# Patient Record
Sex: FEMALE | Race: BLACK OR AFRICAN AMERICAN | Employment: OTHER | URBAN - METROPOLITAN AREA
[De-identification: names, ages, dates, MRNs, and addresses within clinical notes are randomized per-mention and may not be internally consistent; named-entity substitution may affect disease eponyms.]

---

## 2019-12-17 ENCOUNTER — HOSPITAL ENCOUNTER (OUTPATIENT)
Dept: PHYSICAL THERAPY | Age: 79
Discharge: HOME | End: 2019-12-17
Payer: MEDICARE

## 2019-12-17 PROCEDURE — 97110 THERAPEUTIC EXERCISES: CPT

## 2019-12-17 PROCEDURE — 97162 PT EVAL MOD COMPLEX 30 MIN: CPT

## 2019-12-17 PROCEDURE — 97140 MANUAL THERAPY 1/> REGIONS: CPT

## 2019-12-17 NOTE — THERAPY EVALUATION
Allison Zendejas  : 1940  Payor: Emily Ortiz / Plan: [address] / Product Type: Managed Care Medicare /  [facility] [place]  at [facility]  [address]ie [address], [address], [place] [address] [address] [facility]  [phone]   [phone]        OUTPATIENT PHYSICAL THERAPY:Initial Assessment 2019    ICD-10: Treatment Diagnosis:   M54.5 Low Back Pain   M54.16 Lumbar Radiculopathy  R26.2 Difficulty in Walking, Not Elsewhere Classified  Precautions/Allergies:   Patient has no known allergies. Fall Risk Score: 3 (? 5 = High Risk)  MD Orders: Eval and Treat MEDICAL/REFERRING DIAGNOSIS:  Low back pain [M54.5]   DATE OF ONSET:   REFERRING PHYSICIAN: Fritz Bergeron MD  RETURN PHYSICIAN APPOINTMENT: TBD by patient     ASSESSMENT:  Ms. Andre Figueroa has attended 1 physical therapy session including the initial evaluation. Pt presents with s/s consistent with referring diagnosis as well as radicular symptoms in BLE. She has impaired mobility and uses cane at baseline for ambulation. Gross bilat hip strength noted, putting her at risk for falls and decreased ambulation ability due to pain and weakness. Her pain is decreasing her homemaking ability and social life as well. Pt requires skilled PT to address above impairments in order to increased strength, decreased pain, increased ROM and decreased fall risk. Recommending skilled PT: manual therapeutic techniques (as appropriate), therapeutic exercises and activities, balance interventions, and a comprehensive home exercise program to address the current impairments, as listed above. Allison Zendejas will benefit from skilled PT (medically necessary) to address above deficits affecting participation in basic ADLs and overall functional tolerance. PROBLEM LIST (Impacting functional limitations):  1. Decreased Strength  2. Decreased ADL/Functional Activities  3. Decreased Transfer Abilities  4.  Decreased Ambulation Ability/Technique  5. Decreased Balance  6. Increased Pain  7. Decreased Activity Tolerance  8. Increased Fatigue  9. Decreased Flexibility/Joint Mobility  10. Decreased [place] with Home Exercise Program INTERVENTIONS PLANNED:  1. Balance Exercise  2. Bed Mobility  3. Cold  4. Cryotherapy  5. Electrical Stimulation  6. Gait Training  7. Heat  8. Home Exercise Program (HEP)  9. Manual Therapy  10. Neuromuscular Re-education/Strengthening  11. Range of Motion (ROM)  12. Therapeutic Activites  13. Therapeutic Exercise/Strengthening  14. Transcutaneous Electrical Nerve Stimulation (TENS)  15. Transfer Training  16. Aquatic Therapy   TREATMENT PLAN:  TREATMENT PLAN:  Effective Dates: 12/17/2019 TO 3/16/2020 (90 days). Frequency/Duration: 3 times a week for 90 Days (2x/week 12/17/2019-12/27/2019 due to scheduling conflicts)  Short-Term Functional Goals: Time Frame: 4 weeks  1. Pt will be compliant with HEP. 2. Pt will decreased worst pain to 4/10 or less in order to decreased antalgia while walking  3. Pt will decreased BARBY to 20 or less in order to resume normal social activities  4. Pt will improve BLE strength to 4/5 in order to improve transfer ability  5. Pt will improve 5x sit to stand to 40 sec or less (with UE)  Discharge Goals: Time Frame: 8 weeks  1. Pt will be independent with HEP. 2. Pt will decreased worst pain to 2/10 or less in order to decrease antalgia while walking  3. Pt will decreased BARBY to 12 or less in order to resume normal social activities  4. Pt will improve BLE strength to 4+/5 or greater in order to improve transfer abiltiy  5. Pt will improve 5x sit to stand to 20 sec or less to decrease fall risk (with UE)    Rehabilitation Potential For Stated Goals: Fair  Regarding Joleen Ward's therapy, I certify that the treatment plan above will be carried out by a therapist or under their direction.   Thank you for this referral,  Shannon Anaya, PT, DPT     Referring Physician Signature: Allison Jorgensen MD              Date                    The information in this section was collected on 12/17/2019 (except where otherwise noted). HISTORY:   History of Present Injury/Illness (Reason for Referral):  Back surgery in May (Pt reports that M.D. \"scraped the arthritis out\"). When sitting it hurts then eases, laying hurts then eases, standing causes continuous pain. Feet having numbness (gabapentin is helping a little bit). All reports states foot numbness and leg pain were present prior to surgery, but back pain has gotten worse. Jp and screws present in back. Second back surgery in 2006, pain stimulator put in ~2017 (states it has not helped). \"I have a knee problem, my knee is killing me. \" No falls in the last 3 month. Pain pills are not helping. CC/Primary Concern:        Pain with walking, sitting and standing. Treatment Side: Bilateral    Hospitalization and time spent in care: Outpatient procedure in May (Wednesday-Friday)    Past Medical History/Comorbidities:   Ms. Sierra Verdin  has a past medical history of Arthritis, Burning feet syndrome (9/23/2016), Diverticulitis, DJD (degenerative joint disease) (9/6/2013), Gastrointestinal disorder, GERD (gastroesophageal reflux disease), Hypertension, Psychiatric disorder, and Thyroid disease. She also has no past medical history of Dementia, Endocrine disease, Hypercholesterolemia, Infectious disease, or Sleep disorder. Ms. Sierra Verdin  has a past surgical history that includes hx cholecystectomy; hx gi; pr neurological procedure unlisted; hx orthopaedic; hx gyn; hx heent; hx heent; and hx lithotripsy. Social History/Living Environment:     Pt lives alone, daughters are able to help with grandkids. Cane to ambulate, ramp on back door. Pain/Symptom Location: LT low back and leg.      Worst Pain: 10/10  Current Pain: 8/10    Aggravating Factors: Sitting, Standing, Walking, Stairs Up, Stairs Down, Bending, Twisting, Bed Mobility, Sit to stand and Stand to sit    Alleviating Factors/Positions/Motions: After laying or sitting for a while    General Health: Fair    Diagnostic Imaging: X-Rays     Occupation: unemployed, disabled    Unexplained Weight Loss: No      Prior Level of Function/Work/Activity:  Cane since 2018 has help from children and grandchildren to help with housework. Patient Goals: To get better walking, sitting and standing    Current Medications:       Current Outpatient Medications:     famotidine (PEPCID) 20 mg tablet, Take 1 Tab by mouth two (2) times a day., Disp: 90 Tab, Rfl: 2    gabapentin (NEURONTIN) 100 mg capsule, Take 1 Cap by mouth two (2) times a day., Disp: 60 Cap, Rfl: 02    mirtazapine (REMERON) 30 mg tablet, Take 1 Tab by mouth nightly., Disp: 30 Tab, Rfl: 3    ondansetron hcl (ZOFRAN) 8 mg tablet, Take 1 Tab by mouth every eight (8) hours as needed for Nausea., Disp: 30 Tab, Rfl: 1    amLODIPine (NORVASC) 2.5 mg tablet, Take 1 Tab by mouth daily. , Disp: 90 Tab, Rfl: 4    aspirin delayed-release 81 mg tablet, Take 81 mg by mouth daily. , Disp: , Rfl:    Date Last Reviewed:  12/17/2019       Ambulatory/Rehab Services H2 Model Falls Risk Assessment    Risk Factors:       (2)  Symptomatic Depression Ability to Rise from Chair:       (1)  Pushes up, successful in one attempt    Falls Prevention Plan:       No modifications necessary   Total: (5 or greater = High Risk): 3    ©2010 [facility] [address] States Patent #[id number].  Federal Law prohibits the replication, distribution or use without written permission from [facility]        Number of Personal Factors/Comorbidities that affect the Plan of Care: 3+: HIGH COMPLEXITY   EXAMINATION:   Inspection        Patient Consent: Yes        Iliac Crest: Left Elevated      PSIS: Level      ASIS: Level        Additional Comments:   ________________________________________________________________________________________________  Observation Transfers: Sit to stand: increased UE use and increased time  and Supine to sit: Increased time and pain (Taught log roll)        Posture: Rolled shoulder, increased thoracic kyphosis, increased lumbar lordosis        Gait: Antalgic, Lacks Proper Foot Strike/Toe Off, Decreased Step Length and decreased hip ext        Assistive Device:        Type: straight cane        Hand Use: Left         Stairs: Not tested        Edema: No Edema, Erythema or Ecchymosis noted          Pitting: No        Movement Apprehension: Trunk flexion, hip flexion, Knee ext (RT>LT)    ________________________________________________________________________________________________  Range of Motion        Lumbar  Joint:      Right (Degrees/Percent) Left (Degrees/Percent)   Flexion Hands to reach thighs    Extension 20%    Side Bending Not tested Not tested   Rotation 40% 40%               Additional Comments: Pain with flexion and extension in low back. No evidence of centralization   ________________________________________________________________________________________________  Strength          Lower Extremity  Joint:      RIGHT LEFT   Hip Flexion 3-/5 3/5   Hip Extension 2+/5 2+/5   Hip Internal Rotation 4-/5 4-/5   Hip External Rotation 5/5 5/5     ________________________________________________________________________________________________  [facility]        C/O Radicular Symptoms: Yes: Pain from low back down LLE to foot.  Bilat foot numbness, decreased sensation on RT side           LE Neurologic Screen  LE NEUROLOGICAL SCREEN:     -MYOTOMES  Date:     Right Left   L1 & L2  (Hip Flexion) WNL WNL   L3   (Knee Ext) WNL WNL   L4  (Dorsiflexion) WNL WNL   L5  (Great Toe Ext) WNL WNL   S1  (Plantarflexion) WNL WNL   S2  (Knee Flex) WNL WNL       -Dermatomoes  Date:     Right Left   L1/2 WNL WNL   L3 WNL WNL   L4 Diminished to light touch Diminished to light touch   L5 Diminished to light touch WNL   S1 Diminished to light touch WNL S2 Diminished to light touch WNL     ________________________________________________________________________________________________  Special Tests        Lumbar:        Lucita's SignPositive on LT, SLR: Positive for low back pain on RT, Passive Vertebral Mobility: hypomobile grossly (Jp in back), Josefa: Positive bilat and SI Compression: positive            ________________________________________________________________________________________________  Palpation: Increased tenderness in RT greater trochanter, increased tenderness in lumbar paraspinals, increased tone in thoracic paraspinals. Body Structures Involved:  11. Bones  12. Joints  13. Muscles  14. Ligaments Body Functions Affected:  17. Sensory/Pain  18. Neuromusculoskeletal  19. Movement Related Activities and Participation Affected:  6. General Tasks and Demands  7. Mobility  8. Self Care  9. Domestic Life  10. Interpersonal Interactions and Relationships  11. Community, Social and [place]   Number of elements (examined above) that affect the Plan of Care: 4+: HIGH COMPLEXITY   CLINICAL PRESENTATION:   Presentation: Evolving clinical presentation with changing clinical characteristics: MODERATE COMPLEXITY   CLINICAL DECISION MAKING:   Outcome Measure: Tool Used: Modified Oswestry Low Back Pain Questionnaire  Score:  Initial: 32/50  Most Recent: X/50 (Date: -- )   Interpretation of Score: Each section is scored on a 0-5 scale, 5 representing the greatest disability. The scores of each section are added together for a total score of 50. Tool Used: Five Times Sit to Stand (FTSST or 5xSST)  Score:  Initial: 55 seconds (with UE) Most Recent:  seconds (Date: -- )   Interpretation of Score: This is a measure of functional lower limb muscle strength and quantifying functional change of transitional movements.   A score of 12 seconds or less indicates a normal level of function for community dwelling older individuals, a score of 15 seconds or more is at risk for fall. Medical Necessity:   · Patient is expected to demonstrate progress in strength, range of motion and balance to improve safety during ambulation and decrease pain with household and community ADLs. Reason for Services/Other Comments:  · Patient continues to require skilled intervention due to increased pain, weakness and decreased ROM putting pt at risk for falls .    Use of outcome tool(s) and clinical judgement create a POC that gives a: Questionable prediction of patient's progress: MODERATE COMPLEXITY

## 2019-12-17 NOTE — PROGRESS NOTES
Yasemin Matos  : 1940  Payor: Mckay Obrien / Plan: [address] / Product Type: Managed Care Medicare /  [facility] [place]  at [facility]  [facility], [address], [place], [address] [facility]  [phone]   [phone]      OUTPATIENT PHYSICAL THERAPY: Daily Treatment Note 2019  Visit Count:  1    ICD-10: Treatment Diagnosis:   M54.5 Low Back Pain   M54.16 Lumbar Radiculopathy  R26.2 Difficulty in Walking, Not Elsewhere Classified  Precautions/Allergies:   Patient has no known allergies. Fall Risk Score: 3 (? 5 = High Risk)  TREATMENT PLAN:    Effective Dates: 2019 TO 3/16/2020 (90 days). Frequency/Duration: 3 times a week for 90 Days (2x/week for 2019-2019)        PRE-TREATMENT SYMPTOMS/COMPLAINTS:  See Eval    MEDICATIONS REVIEWED:  2019   TREATMENT:   In addition to Assessment/Re-Assessment sessions the following treatments were rendered)    THERAPEUTIC EXERCISE: (15 minutes):  Exercises per grid below to improve mobility, strength and balance. Required minimal visual and verbal cues to promote proper body alignment and promote proper body posture. Progressed resistance, range and complexity of movement as indicated. Date:  2019 Date:   Date:     Activity/Exercise Parameters Parameters Parameters   Lower Trunk Rotations Hooklying x 15 with 5sec hold     Knee to chest X 20 sec RT leg with strap     SciFit BUE and BLE with  [facility] x 10 min level 2                               MANUAL THERAPY: (25 minutes): Joint mobilization, Soft tissue mobilization and Manipulation was utilized and necessary because of the patient's restricted joint motion, painful spasm, loss of articular motion and restricted motion of soft tissue.    +STM to lumbar paraspinals  +STM to bilat piriformis  +Lumbar UPA (RT side L2-5) and CPA grade II/III  +Manual stretch of bilat hamstrings and glutes    Neuromuscular Re-education (0 min):  Exercise/activities per grid below to improve posture and and facilitate muscle activation. Required moderate visual, verbal and tactile cues     Date:     Date:   Date:     Activity/Exercise Parameters Parameters Parameters   Hooklying TA      Sciatic Nerve Glide                                        (Used abbreviations: MET - muscle energy technique; PNF - proprioceptive neuromuscular facilitation; NMR - neuromuscular re-education; AP - anterior to posterior; PA - posterior to anterior)    MODALITIES: (10 minutes): *  Hot Pack Therapy in order to provide analgesia. In conjunction with sci fit      TREATMENT/SESSION ASSESSMENT:  Georgia Duarte verbalized understanding of role of PT and POC. Pt with good overall response to manual therapy and enjoyed using sci fit. Subjective pain response decreased. Per eval, pt has increased weakness, pain and decreased balance contributing to gait deviations and fall risk. Required moderate verbal and visual cue to exercise from and techniques with education to perform as HEP. Education: HEP for LTR and SKTC    RECOMMENDATIONS/INTENT FOR NEXT TREATMENT SESSION: \"Next visit will focus on advancements to more challenging activities\".     PAIN: Initial: 8/10 Post Session:  6/10     [employer] Portal    Total Treatment Billable Duration: 40  PT Patient Time In/Time Out  Time In: 1300  Time Out: [address], PT, DPT    Future Appointments   Date Time Provider Demetria George   12/20/2019 10:15 AM Iqra Moss [facility]   12/24/2019 11:00 AM [facility]   12/27/2019  2:30 PM [facility]   12/30/2019  2:30 PM [facility]   1/3/2020  2:30 PM [facility] S[facility]   1/6/2020  2:30 PM [facility] S[facility]   1/8/2020  2:30 PM [facility] S[facility]   1/10/2020  1:00 PM [facility]   1/10/2020  2:45 PM Violeta Viramontes MD [facility]

## 2019-12-20 ENCOUNTER — HOSPITAL ENCOUNTER (OUTPATIENT)
Dept: PHYSICAL THERAPY | Age: 79
Discharge: HOME | End: 2019-12-20
Payer: MEDICARE

## 2019-12-27 ENCOUNTER — HOSPITAL ENCOUNTER (OUTPATIENT)
Dept: PHYSICAL THERAPY | Age: 79
Discharge: HOME | End: 2019-12-27
Payer: MEDICARE

## 2019-12-27 PROCEDURE — 97140 MANUAL THERAPY 1/> REGIONS: CPT

## 2019-12-27 PROCEDURE — 97110 THERAPEUTIC EXERCISES: CPT

## 2019-12-27 NOTE — PROGRESS NOTES
Allison Zendejas  : 1940  Payor: Emily Ortiz / Plan: [address] / Product Type: Managed Care Medicare /  [facility] [place]  at [facility]  [address]ie [address], [address], [place], [facility]  [phone]   [phone]      OUTPATIENT PHYSICAL THERAPY: Daily Treatment Note 2019  Visit Count:  2    ICD-10: Treatment Diagnosis:   M54.5 Low Back Pain   M54.16 Lumbar Radiculopathy  R26.2 Difficulty in Walking, Not Elsewhere Classified  Precautions/Allergies:   Patient has no known allergies. Fall Risk Score: 3 (? 5 = High Risk)  TREATMENT PLAN:    Effective Dates: 2019 TO 3/16/2020 (90 days). Frequency/Duration: 3 times a week for 90 Days (2x/week for 2019-2019)        PRE-TREATMENT SYMPTOMS/COMPLAINTS: \"I ain't worth a carrie today. \" Pt reports she has been trying exercises, but getting out of bed is still difficult. MEDICATIONS REVIEWED:  2019   TREATMENT:       THERAPEUTIC EXERCISE: (10 minutes):  Exercises per grid below to improve mobility, strength and balance. Required minimal visual and verbal cues to promote proper body alignment and promote proper body posture. Progressed resistance, range and complexity of movement as indicated. Date:  2019 Date:   Date:     Activity/Exercise Parameters Parameters Parameters   Lower Trunk Rotations Hooklying x 15 with 5sec hold     Knee to chest X 20 sec RT leg with strap     SciFit BUE and BLE with  [facility] x 10 min level 2 BUE and BLE x10 min lvl 2 with  [facility]                              MANUAL THERAPY: (25 minutes): Joint mobilization, Soft tissue mobilization and Manipulation was utilized and necessary because of the patient's restricted joint motion, painful spasm, loss of articular motion and restricted motion of soft tissue.    +STM to lumbar paraspinals (Using roller)  +STM to bilat piriformis (Using Roller Bar)  +Lumbar UPA (RT side L2-5) and CPA grade II/III  +Manual stretch of bilat hamstrings and glutes  +Sidelying lumbar roll mobilization bilat Grade II/III      Neuromuscular Re-education (0 min):  Exercise/activities per grid below to improve posture and and facilitate muscle activation. Required moderate visual, verbal and tactile cues     Date:     Date:   Date:     Activity/Exercise Parameters Parameters Parameters   Hooklying TA      Sciatic Nerve Glide                                        (Used abbreviations: MET - muscle energy technique; PNF - proprioceptive neuromuscular facilitation; NMR - neuromuscular re-education; AP - anterior to posterior; PA - posterior to anterior)    MODALITIES: (10 minutes): *  Hot Pack Therapy in order to provide analgesia. In conjunction with sci fit      TREATMENT/SESSION ASSESSMENT:  Pt with good response to stretching in clinic today. She missed her first two appointment following evaluation and had noted stiffness that has persisted even with the use of current HEP. She had decreased pain following manual therapy above. Treatment with heavy manual focus due to increased over stiffness and poor response to active motions. Pt advised to continue HEP and was explained the importance of regularly attending sessions. Education: HEP for LTR and SKTC    RECOMMENDATIONS/INTENT FOR NEXT TREATMENT SESSION: \"Next visit will focus on advancements to more challenging activities\".     PAIN: Initial: 8/10 Post Session: 5/10     [facility] Portal    Total Treatment Billable Duration: 40  PT Patient Time In/Time Out  Time In: 1430  Time Out: Leia [address], PT, DPT    Future Appointments   Date Time Provider Demetria George   12/30/2019  2:30 PM [facility]   1/3/2020  2:30 PM [facility]   1/6/2020  2:30 PM [facility]   1/8/2020  2:30 PM [facility]   1/10/2020  1:00 PM [facility]   1/10/2020  2:45 PM Laura Steele MD [facility]

## 2019-12-30 ENCOUNTER — HOSPITAL ENCOUNTER (OUTPATIENT)
Dept: PHYSICAL THERAPY | Age: 79
Discharge: HOME | End: 2019-12-30
Payer: MEDICARE

## 2019-12-30 PROCEDURE — 97140 MANUAL THERAPY 1/> REGIONS: CPT

## 2019-12-30 PROCEDURE — 97110 THERAPEUTIC EXERCISES: CPT

## 2019-12-30 NOTE — PROGRESS NOTES
Sulma Jeff  : 1940  Payor: Haroon Nery / Plan: [address] / Product Type: Managed Care Medicare /  [facility] [place]  at [facility]  Natalie 66, [address], [place], [facility]  [phone]   [phone]      OUTPATIENT PHYSICAL THERAPY: Daily Treatment Note 2019  Visit Count:  3    ICD-10: Treatment Diagnosis:   M54.5 Low Back Pain   M54.16 Lumbar Radiculopathy  R26.2 Difficulty in Walking, Not Elsewhere Classified  Precautions/Allergies:   Patient has no known allergies. Fall Risk Score: 3 (? 5 = High Risk)  TREATMENT PLAN:    Effective Dates: 2019 TO 3/16/2020 (90 days). Frequency/Duration: 3 times a week for 90 Days (2x/week for 2019-2019)        PRE-TREATMENT SYMPTOMS/COMPLAINTS: Pt report she felt better after last session, but then started hurting again the next day. Reports she did not exercise as instructed. MEDICATIONS REVIEWED:  2019   TREATMENT:       THERAPEUTIC EXERCISE: (10 minutes):  Exercises per grid below to improve mobility, strength and balance. Required minimal visual and verbal cues to promote proper body alignment and promote proper body posture. Progressed resistance, range and complexity of movement as indicated. Date:  2019 Date:   Date:     Activity/Exercise Parameters Parameters Parameters   Lower Trunk Rotations Hooklying x 15 with 5sec hold     Knee to chest X 20 sec RT leg with strap     SciFit BUE and BLE with  [facility] x 10 min level 2 BUE and BLE x10 min lvl 2 with  [facility]                              MANUAL THERAPY: (30 minutes): Joint mobilization, Soft tissue mobilization and Manipulation was utilized and necessary because of the patient's restricted joint motion, painful spasm, loss of articular motion and restricted motion of soft tissue.    +STM to lumbar paraspinals (Using roller)  +STM to bilat piriformis (Using [employer])  +Lumbar UPA (RT side L2-5) and CPA grade II/III  +Manual stretch of bilat hamstrings and glutes  +Sidelying lumbar roll mobilization bilat Grade II/III      Neuromuscular Re-education (0 min):  Exercise/activities per grid below to improve posture and and facilitate muscle activation. Required moderate visual, verbal and tactile cues     Date:     Date:   Date:     Activity/Exercise Parameters Parameters Parameters   Hooklying TA      Sciatic Nerve Glide                                        (Used abbreviations: MET - muscle energy technique; PNF - proprioceptive neuromuscular facilitation; NMR - neuromuscular re-education; AP - anterior to posterior; PA - posterior to anterior)    MODALITIES: (10 minutes): *  Hot Pack Therapy in order to provide analgesia. In conjunction with sci fit      TREATMENT/SESSION ASSESSMENT: Pt was reminded of importance in regards to stretching with HEP. Still responds very well to manual stretching and soft tissue mobilizations. She was able to move on and off of table easier following mobilization. Pt may need continued convincing that exercises are beneficial as she tends to ask more about medications. Pt has gross weakness (RT>LT) and is still a fall risk. Education: Cont HEP    RECOMMENDATIONS/INTENT FOR NEXT TREATMENT SESSION: \"Next visit will focus on advancements to more challenging activities\".     PAIN: Initial: 8/10 Post Session: 4/10     [facility] Portal    Total Treatment Billable Duration: 40  PT Patient Time In/Time Out  Time In: 1430  Time Out: Leia 67, PT, DPT    Future Appointments   Date Time Provider Demetria George   1/3/2020  2:30 PM [facility]   1/6/2020  2:30 PM [facility]   1/8/2020  2:30 PM [facility]   1/10/2020  1:00 PM [facility]   1/10/2020  2:45 PM Yolis Alexis MD [facility]

## 2020-01-03 ENCOUNTER — HOSPITAL ENCOUNTER (OUTPATIENT)
Dept: PHYSICAL THERAPY | Age: 80
Discharge: HOME | End: 2020-01-03

## 2020-01-10 ENCOUNTER — APPOINTMENT (OUTPATIENT)
Dept: PHYSICAL THERAPY | Age: 80
End: 2020-01-10

## 2020-08-04 ENCOUNTER — HOSPITAL ENCOUNTER (OUTPATIENT)
Dept: PHYSICAL THERAPY | Age: 80
Discharge: HOME | End: 2020-08-04
Payer: COMMERCIAL

## 2020-08-04 PROCEDURE — 97161 PT EVAL LOW COMPLEX 20 MIN: CPT

## 2021-09-09 ENCOUNTER — HOSPITAL ENCOUNTER (OUTPATIENT)
Dept: LAB | Age: 81
Discharge: HOME | End: 2021-09-09

## 2021-09-09 PROCEDURE — 88312 SPECIAL STAINS GROUP 1: CPT

## 2021-09-09 PROCEDURE — 88305 TISSUE EXAM BY PATHOLOGIST: CPT

## 2022-05-15 ENCOUNTER — HOSPITAL ENCOUNTER (EMERGENCY)
Age: 82
Discharge: HOME | End: 2022-05-15
Attending: EMERGENCY MEDICINE
Payer: MEDICARE

## 2022-05-15 ENCOUNTER — APPOINTMENT (OUTPATIENT)
Dept: CT IMAGING | Age: 82
End: 2022-05-15
Attending: EMERGENCY MEDICINE
Payer: MEDICARE

## 2022-05-15 VITALS
DIASTOLIC BLOOD PRESSURE: 89 MMHG | RESPIRATION RATE: 14 BRPM | HEIGHT: 69 IN | OXYGEN SATURATION: 99 % | BODY MASS INDEX: 22.36 KG/M2 | WEIGHT: 151 LBS | HEART RATE: 74 BPM | TEMPERATURE: 98.9 F | SYSTOLIC BLOOD PRESSURE: 176 MMHG
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
## 2022-05-15 LAB
ANION GAP SERPL CALC-SCNC: 4 MMOL/L (ref 7–16)
BASOPHILS # BLD: 0 K/UL (ref 0–0.2)
BASOPHILS NFR BLD: 0 % (ref 0–2)
BUN SERPL-MCNC: 25 MG/DL (ref 8–23)
CALCIUM SERPL-MCNC: 9.7 MG/DL (ref 8.3–10.4)
CHLORIDE SERPL-SCNC: 112 MMOL/L (ref 98–107)
CO2 SERPL-SCNC: 27 MMOL/L (ref 21–32)
CREAT SERPL-MCNC: 1.1 MG/DL (ref 0.6–1)
DIFFERENTIAL METHOD BLD: NORMAL
EOSINOPHIL # BLD: 0.1 K/UL (ref 0–0.8)
EOSINOPHIL NFR BLD: 1 % (ref 0.5–7.8)
ERYTHROCYTE [DISTWIDTH] IN BLOOD BY AUTOMATED COUNT: 12.7 % (ref 11.9–14.6)
GLUCOSE SERPL-MCNC: 92 MG/DL (ref 65–100)
HCT VFR BLD AUTO: 39 % (ref 35.8–46.3)
HGB BLD-MCNC: 13 G/DL (ref 11.7–15.4)
IMM GRANULOCYTES # BLD AUTO: 0 K/UL (ref 0–0.5)
IMM GRANULOCYTES NFR BLD AUTO: 0 % (ref 0–5)
LYMPHOCYTES # BLD: 3.1 K/UL (ref 0.5–4.6)
LYMPHOCYTES NFR BLD: 41 % (ref 13–44)
MCH RBC QN AUTO: 31.2 PG (ref 26.1–32.9)
MCHC RBC AUTO-ENTMCNC: 33.3 G/DL (ref 31.4–35)
MCV RBC AUTO: 93.5 FL (ref 79.6–97.8)
MONOCYTES # BLD: 0.7 K/UL (ref 0.1–1.3)
MONOCYTES NFR BLD: 10 % (ref 4–12)
NEUTS SEG # BLD: 3.6 K/UL (ref 1.7–8.2)
NEUTS SEG NFR BLD: 47 % (ref 43–78)
NRBC # BLD: 0 K/UL (ref 0–0.2)
PLATELET # BLD AUTO: 216 K/UL (ref 150–450)
PMV BLD AUTO: 9.9 FL (ref 9.4–12.3)
POTASSIUM SERPL-SCNC: 4.1 MMOL/L (ref 3.5–5.1)
RBC # BLD AUTO: 4.17 M/UL (ref 4.05–5.2)
SODIUM SERPL-SCNC: 143 MMOL/L (ref 136–145)
WBC # BLD AUTO: 7.5 K/UL (ref 4.3–11.1)

## 2022-05-15 PROCEDURE — 72125 CT NECK SPINE W/O DYE: CPT

## 2022-05-15 PROCEDURE — 74011000636 HC RX REV CODE- 636: Performed by: EMERGENCY MEDICINE

## 2022-05-15 PROCEDURE — 99285 EMERGENCY DEPT VISIT HI MDM: CPT

## 2022-05-15 PROCEDURE — 74011000250 HC RX REV CODE- 250: Performed by: EMERGENCY MEDICINE

## 2022-05-15 PROCEDURE — 80048 BASIC METABOLIC PNL TOTAL CA: CPT

## 2022-05-15 PROCEDURE — 74011000258 HC RX REV CODE- 258: Performed by: EMERGENCY MEDICINE

## 2022-05-15 PROCEDURE — 70450 CT HEAD/BRAIN W/O DYE: CPT

## 2022-05-15 PROCEDURE — 71260 CT THORAX DX C+: CPT

## 2022-05-15 PROCEDURE — 85025 COMPLETE CBC W/AUTO DIFF WBC: CPT

## 2022-05-15 RX ORDER — SODIUM CHLORIDE 0.9 % (FLUSH) 0.9 %
10 SYRINGE (ML) INJECTION
Status: COMPLETED | OUTPATIENT
Start: 2022-05-15 | End: 2022-05-15

## 2022-05-15 RX ADMIN — SODIUM CHLORIDE, PRESERVATIVE FREE 10 ML: 5 INJECTION INTRAVENOUS at 18:34

## 2022-05-15 RX ADMIN — SODIUM CHLORIDE 100 ML: 9 INJECTION, SOLUTION INTRAVENOUS at 18:33

## 2022-05-15 RX ADMIN — IOPAMIDOL 100 ML: 755 INJECTION, SOLUTION INTRAVENOUS at 18:33
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
## 2022-05-15 NOTE — ED NOTES
Patient report to [facility]. Patient care to be assumed at this time.
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
## 2022-08-12 ENCOUNTER — HOSPITAL ENCOUNTER (EMERGENCY)
Age: 82
Discharge: HOME | End: 2022-08-12
Attending: EMERGENCY MEDICINE
Payer: MEDICARE

## 2022-08-12 ENCOUNTER — HOSPITAL ENCOUNTER (EMERGENCY)
Dept: CT IMAGING | Age: 82
Discharge: HOME | End: 2022-08-15
Payer: MEDICARE

## 2022-08-12 VITALS
SYSTOLIC BLOOD PRESSURE: 144 MMHG | OXYGEN SATURATION: 98 % | RESPIRATION RATE: 18 BRPM | BODY MASS INDEX: 22.51 KG/M2 | TEMPERATURE: 98.6 F | DIASTOLIC BLOOD PRESSURE: 70 MMHG | HEIGHT: 69 IN | HEART RATE: 53 BPM | WEIGHT: 152 LBS

## 2022-08-12 DIAGNOSIS — M54.2 NECK PAIN: Primary | ICD-10-CM

## 2022-08-12 PROCEDURE — 99284 EMERGENCY DEPT VISIT MOD MDM: CPT

## 2022-08-12 PROCEDURE — 72125 CT NECK SPINE W/O DYE: CPT

## 2022-08-12 RX ORDER — ACETAMINOPHEN 500 MG
1000 TABLET ORAL
Status: DISCONTINUED | OUTPATIENT
Start: 2022-08-12 | End: 2022-08-12 | Stop reason: HOSPADM

## 2022-08-12 ASSESSMENT — PAIN SCALES - GENERAL: PAINLEVEL_OUTOF10: 10

## 2022-08-12 NOTE — ED NOTES
I have reviewed discharge instructions with the patient. The patient verbalized understanding. Patient left ED via Discharge Method: ambulatory to Home with family. Opportunity for questions and clarification provided. Patient given 0 scripts. To continue your aftercare when you leave the hospital, you may receive an automated call from our care team to check in on how you are doing. This is a free service and part of our promise to provide the best care and service to meet your aftercare needs.  If you have questions, or wish to unsubscribe from this service please call [phone]. Thank you for Choosing our [address] [place] [address] Emergency Department.            Joaquín Tijerina RN  08/12/22 3027

## 2022-08-12 NOTE — ED PROVIDER NOTES
[facility] Emergency Department Provider Note                   PCP:                Anne Grant MD               Age: 78 y.o. Sex: female     No diagnosis found. DISPOSITION          MDM  Number of Diagnoses or Management Options  Neck pain: new, needed workup     Amount and/or Complexity of Data Reviewed  Tests in the radiology section of CPT®: ordered and reviewed  Decide to obtain previous medical records or to obtain history from someone other than the patient: yes  Review and summarize past medical records: yes  Independent visualization of images, tracings, or specimens: yes    Risk of Complications, Morbidity, and/or Mortality  Presenting problems: moderate  Diagnostic procedures: moderate  Management options: moderate    Patient Progress  Patient progress: stable     As in HPI. With neck pain for 1 month secondary to ground-level fall. States that at times the neck pain is caused her headaches, but denies any headaches in recent days. She denies any new or worsening pain, radiating pain no other complaints. She is not toxic appearing and appears no distress. She is wearing a soft neck brace which she wears for comfort. She remove this and demonstrates ability to fully range her neck without limitation but she endorses a \"tightness,\" in the left paraspinal musculature and left trapezius when she does this. No step-off and no midline tenderness. She is neurovascular intact there is no focal deficits, and low clinical suspicion of stroke, dissection, spinal abscess, significant spinal injury, fracture or other acute emergent process. Obtain CT scan with no acute emergent process noted. Informed of all findings. Given very strict return precautions, discussed therapeutic measures, discussed appropriate follow-up and advised to follow-up with PCP and orthopedics in coming days. To return for new, worsening, concerning symptoms. Patient is well-hydrated appearing, no distress. Nontoxic-appearing, tolerating oral intake, hemodynamically stable. All findings and plan were discussed with the patient. All questions answered. Discussed with the patient that an unremarkable evaluation in the ED does not preclude the development or presence of a serious or life threatening condition. Patient was instructed to return immediately for any worsening or change in current symptoms, or if symptoms do not continue to improve. I instructed them to follow up with their primary care provider, own specialist, or medical provider that I am recommending for him within the next 2-3 days  The patient acknowledged understanding plan of care and affirmed approval.     Signed by: ALBERT Greene     This note created using Dragon voice recognition software. Please excuse any accidental errors associated with its use, as note has not been fully proofread and edited. Orders Placed This Encounter   Procedures    CT CERVICAL SPINE WO CONTRAST        Nano Uribe is a 78 y.o. female who presents to the Emergency Department with chief complaint of    Chief Complaint   Patient presents with    Neck Pain      HPI  Pleasant 58-year-old female presents to the ED with complaint of neck pain x1 month. States she suffered a mechanical ground-level fall approximately 1 month ago and since that time has had neck pain. States she has been evaluated by her PCP for this as well as pain management. She is following with pain management. States that PCP thought she may need additional imaging, went to urgent care a couple of weeks ago and they informed her that she could not obtain imaging at that location that she should go to the ED. Patient states that she did not follow-up at that time, but returns today for CT scan of her neck.   Denies numbness/tingling/weakness, denies headaches, visual change, nausea vomiting, confusion, seizure activity, syncope or near syncope, fevers or chills, illness, incontinence, difficulty bladder or bowels and all other complaint. Review of Systems  Constitutional: Negative for fever. Negative for appetite change, chills, diaphoresis and unexpected weight change. 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    Diverticulitis     DJD (degenerative joint disease) 9/6/2013    Gastrointestinal disorder     GERD    GERD (gastroesophageal reflux disease)     controlled with meds     Hypertension     controlled with meds     Psychiatric disorder     anxiety/depression     Thyroid disease     hx goiter - thyroidectomy - pt on no meds at this time         Past Surgical History:   Procedure Laterality Date    CHOLECYSTECTOMY      GI      cholecystectomy     GYN      hysterectomy complete     HEENT      tonsilectomy     HEENT      thyroidectomy     LITHOTRIPSY      NEUROLOGICAL SURGERY      back surgery x 4 with breanne & screws     ORTHOPEDIC SURGERY      right hip replacement         Family History   Problem Relation Age of Onset    Diabetes Mother     Asthma Father     Cancer Sister     Heart Disease Brother     Cancer Brother     Hypertension Mother     Stroke Mother         Social History     Socioeconomic History    Marital status:    Tobacco Use    Smoking status: Never    Smokeless tobacco: Never   Substance and Sexual Activity    Alcohol use: No    Drug use: No         Patient has no known allergies.      Previous Medications    AMLODIPINE (NORVASC) 2.5 MG TABLET    Take 2.5 mg by mouth daily    ASPIRIN 81 MG EC TABLET    Take 81 mg by mouth daily    CYANOCOBALAMIN 500 MCG TABLET    Take 500 mcg by mouth daily    DICLOFENAC SODIUM (VOLTAREN) 1 % GEL    Apply topically    FLUOXETINE HCL, PMDD, 20 MG TABS    Take 20 mg by mouth daily    GABAPENTIN (NEURONTIN) 300 MG CAPSULE    Take 300 mg by mouth 2 times daily. NALOXEGOL (MOVANTIK) 25 MG TABS TABLET    Take by mouth every morning (before breakfast)    OMEPRAZOLE (PRILOSEC) 20 MG DELAYED RELEASE CAPSULE    Take 20 mg by mouth daily    ONDANSETRON (ZOFRAN) 4 MG TABLET    Take 4 mg by mouth every 8 hours as needed    OXYCODONE-ACETAMINOPHEN (PERCOCET) 7.5-325 MG PER TABLET    Take 1 tablet by mouth every 4 hours as needed. Vitals signs and nursing note reviewed. Patient Vitals for the past 4 hrs:   Temp Pulse Resp BP SpO2   08/12/22 1130 98.6 °F (37 °C) 61 18 134/72 97 %          Physical Exam   Constitutional: Oriented to person, place, and time. Appears well-developed and well-nourished. No distress. HENT:    Head: Normocephalic and atraumatic   Right Ear: External ear normal.    Left Ear: External ear normal.     Nose: Nose normal.   Mouth/Throat: Mouth normal.    Eyes: Conjunctivae are normal.   Neck: Supple. No tracheal deviation. + Diffuse tenderness, to include midline, no step-off, no focal tenderness. She demonstrates her ability to fully range her neck without limitation. Cardiovascular: Normal rate, intact distal pulses. Brisk capillary refill intact, less than 2 seconds. Regular rhythm present. No pitting edema. Pulmonary/Chest: Lungs are clear & equal bilaterally. No adventitious sounds. No respiratory distress. Abdominal: Soft. There is no tenderness. Musculoskeletal: No obvious deformity, erythema, edema. Back tenderness, crepitus, swelling, bruising or other abnormal finding. Neurological: Alert and oriented to person, place, and time. No numbness/tingling. No loss of sensation. Positive PMS ×4. GCS= 15. Skin: Skin is warm and dry. Capillary refill takes less than 2 seconds. No abrasion, no lesion, no petechiae and no rash noted. Not diaphoretic. No cyanosis, erythema, or pallor.     Psychiatric: Normal mood and affect. Behavior is normal.    Nursing note and vitals reviewed. Procedures      Labs Reviewed - No data to display     CT CERVICAL SPINE WO CONTRAST   Final Result   1. No acute abnormality. 2. Similar appearance of the multilevel degenerative changes, grade 1   anterolisthesis of C3 on C4, and fusion of C5 and C6. Voice dictation software was used during the making of this note. This software is not perfect and grammatical and other typographical errors may be present. This note has not been completely proofread for errors.      SERGE Galvan - CNP  08/12/22 1919

## 2022-08-12 NOTE — ED TRIAGE NOTES
Patient arrives ambulatory to triage with mask in place. Reports falling 1 month ago and has had neck pain ever since. Reports she went to urgent care and pcp after fall. Reports now wearing neck brace for comfort.

## 2023-06-01 ENCOUNTER — APPOINTMENT (OUTPATIENT)
Dept: GENERAL RADIOLOGY | Age: 83
End: 2023-06-01
Payer: MEDICARE

## 2023-06-01 ENCOUNTER — APPOINTMENT (OUTPATIENT)
Dept: CT IMAGING | Age: 83
End: 2023-06-01
Payer: MEDICARE

## 2023-06-01 ENCOUNTER — HOSPITAL ENCOUNTER (EMERGENCY)
Age: 83
Discharge: HOME | End: 2023-06-01
Attending: EMERGENCY MEDICINE
Payer: MEDICARE

## 2023-06-01 VITALS
SYSTOLIC BLOOD PRESSURE: 149 MMHG | DIASTOLIC BLOOD PRESSURE: 80 MMHG | BODY MASS INDEX: 22.62 KG/M2 | RESPIRATION RATE: 16 BRPM | WEIGHT: 153.2 LBS | TEMPERATURE: 97.9 F | OXYGEN SATURATION: 95 % | HEART RATE: 80 BPM

## 2023-06-01 DIAGNOSIS — S62.102A CLOSED FRACTURE OF LEFT WRIST, INITIAL ENCOUNTER: ICD-10-CM

## 2023-06-01 DIAGNOSIS — S09.90XA INJURY OF HEAD, INITIAL ENCOUNTER: ICD-10-CM

## 2023-06-01 DIAGNOSIS — R55 SYNCOPE AND COLLAPSE: Primary | ICD-10-CM

## 2023-06-01 DIAGNOSIS — N30.00 ACUTE CYSTITIS WITHOUT HEMATURIA: ICD-10-CM

## 2023-06-01 LAB
ALBUMIN SERPL-MCNC: 3.5 G/DL (ref 3.2–4.6)
ALBUMIN/GLOB SERPL: 1.1 (ref 0.4–1.6)
ALP SERPL-CCNC: 67 U/L (ref 50–136)
ALT SERPL-CCNC: 37 U/L (ref 12–65)
ANION GAP SERPL CALC-SCNC: 2 MMOL/L (ref 2–11)
APPEARANCE UR: ABNORMAL
AST SERPL-CCNC: 31 U/L (ref 15–37)
BACTERIA URNS QL MICRO: ABNORMAL /HPF
BASOPHILS # BLD: 0.1 K/UL (ref 0–0.2)
BASOPHILS NFR BLD: 1 % (ref 0–2)
BILIRUB SERPL-MCNC: 0.3 MG/DL (ref 0.2–1.1)
BILIRUB UR QL: NEGATIVE
BUN SERPL-MCNC: 20 MG/DL (ref 8–23)
CALCIUM SERPL-MCNC: 9.2 MG/DL (ref 8.3–10.4)
CHLORIDE SERPL-SCNC: 108 MMOL/L (ref 101–110)
CO2 SERPL-SCNC: 29 MMOL/L (ref 21–32)
COLOR UR: ABNORMAL
CREAT SERPL-MCNC: 1.2 MG/DL (ref 0.6–1)
DIFFERENTIAL METHOD BLD: ABNORMAL
EKG ATRIAL RATE: 101 BPM
EKG DIAGNOSIS: NORMAL
EKG P AXIS: 70 DEGREES
EKG P-R INTERVAL: 179 MS
EKG Q-T INTERVAL: 357 MS
EKG QRS DURATION: 83 MS
EKG QTC CALCULATION (BAZETT): 463 MS
EKG R AXIS: -22 DEGREES
EKG T AXIS: 64 DEGREES
EKG VENTRICULAR RATE: 101 BPM
EOSINOPHIL # BLD: 0.2 K/UL (ref 0–0.8)
EOSINOPHIL NFR BLD: 2 % (ref 0.5–7.8)
EPI CELLS #/AREA URNS HPF: ABNORMAL /HPF
ERYTHROCYTE [DISTWIDTH] IN BLOOD BY AUTOMATED COUNT: 13.1 % (ref 11.9–14.6)
GLOBULIN SER CALC-MCNC: 3.2 G/DL (ref 2.8–4.5)
GLUCOSE SERPL-MCNC: 126 MG/DL (ref 65–100)
GLUCOSE UR STRIP.AUTO-MCNC: NEGATIVE MG/DL
HCT VFR BLD AUTO: 38.6 % (ref 35.8–46.3)
HGB BLD-MCNC: 13 G/DL (ref 11.7–15.4)
HGB UR QL STRIP: NEGATIVE
IMM GRANULOCYTES # BLD AUTO: 0 K/UL (ref 0–0.5)
IMM GRANULOCYTES NFR BLD AUTO: 0 % (ref 0–5)
INR PPP: 0.9
KETONES UR QL STRIP.AUTO: NEGATIVE MG/DL
LEUKOCYTE ESTERASE UR QL STRIP.AUTO: NEGATIVE
LYMPHOCYTES # BLD: 2.6 K/UL (ref 0.5–4.6)
LYMPHOCYTES NFR BLD: 37 % (ref 13–44)
MAGNESIUM SERPL-MCNC: 2 MG/DL (ref 1.8–2.4)
MCH RBC QN AUTO: 32.3 PG (ref 26.1–32.9)
MCHC RBC AUTO-ENTMCNC: 33.7 G/DL (ref 31.4–35)
MCV RBC AUTO: 96 FL (ref 82–102)
MONOCYTES # BLD: 0.6 K/UL (ref 0.1–1.3)
MONOCYTES NFR BLD: 9 % (ref 4–12)
NEUTS SEG # BLD: 3.5 K/UL (ref 1.7–8.2)
NEUTS SEG NFR BLD: 51 % (ref 43–78)
NITRITE UR QL STRIP.AUTO: POSITIVE
NRBC # BLD: 0 K/UL (ref 0–0.2)
OTHER OBSERVATIONS: ABNORMAL
PH UR STRIP: 6.5 (ref 5–9)
PLATELET # BLD AUTO: 199 K/UL (ref 150–450)
PMV BLD AUTO: 9.5 FL (ref 9.4–12.3)
POTASSIUM SERPL-SCNC: 4 MMOL/L (ref 3.5–5.1)
PROT SERPL-MCNC: 6.7 G/DL (ref 6.3–8.2)
PROT UR STRIP-MCNC: NEGATIVE MG/DL
PROTHROMBIN TIME: 12.7 SEC (ref 12.6–14.3)
RBC # BLD AUTO: 4.02 M/UL (ref 4.05–5.2)
SODIUM SERPL-SCNC: 139 MMOL/L (ref 133–143)
SP GR UR REFRACTOMETRY: 1.02 (ref 1–1.02)
TROPONIN I SERPL HS-MCNC: 4.3 PG/ML (ref 0–37)
UROBILINOGEN UR QL STRIP.AUTO: 0.2 EU/DL (ref 0.2–1)
WBC # BLD AUTO: 7 K/UL (ref 4.3–11.1)
WBC URNS QL MICRO: ABNORMAL /HPF

## 2023-06-01 PROCEDURE — 2580000003 HC RX 258: Performed by: EMERGENCY MEDICINE

## 2023-06-01 PROCEDURE — 6370000000 HC RX 637 (ALT 250 FOR IP): Performed by: EMERGENCY MEDICINE

## 2023-06-01 PROCEDURE — 81001 URINALYSIS AUTO W/SCOPE: CPT

## 2023-06-01 PROCEDURE — 70450 CT HEAD/BRAIN W/O DYE: CPT

## 2023-06-01 PROCEDURE — 84484 ASSAY OF TROPONIN QUANT: CPT

## 2023-06-01 PROCEDURE — 93005 ELECTROCARDIOGRAM TRACING: CPT | Performed by: EMERGENCY MEDICINE

## 2023-06-01 PROCEDURE — 87086 URINE CULTURE/COLONY COUNT: CPT

## 2023-06-01 PROCEDURE — 29125 APPL SHORT ARM SPLINT STATIC: CPT

## 2023-06-01 PROCEDURE — 83735 ASSAY OF MAGNESIUM: CPT

## 2023-06-01 PROCEDURE — 99285 EMERGENCY DEPT VISIT HI MDM: CPT

## 2023-06-01 PROCEDURE — 80053 COMPREHEN METABOLIC PANEL: CPT

## 2023-06-01 PROCEDURE — 85610 PROTHROMBIN TIME: CPT

## 2023-06-01 PROCEDURE — 93010 ELECTROCARDIOGRAM REPORT: CPT | Performed by: INTERNAL MEDICINE

## 2023-06-01 PROCEDURE — 73110 X-RAY EXAM OF WRIST: CPT

## 2023-06-01 PROCEDURE — 85025 COMPLETE CBC W/AUTO DIFF WBC: CPT

## 2023-06-01 RX ORDER — 0.9 % SODIUM CHLORIDE 0.9 %
1000 INTRAVENOUS SOLUTION INTRAVENOUS
Status: COMPLETED | OUTPATIENT
Start: 2023-06-01 | End: 2023-06-01

## 2023-06-01 RX ORDER — HYDROCODONE BITARTRATE AND ACETAMINOPHEN 5; 325 MG/1; MG/1
1 TABLET ORAL EVERY 6 HOURS PRN
Qty: 11 TABLET | Refills: 0 | Status: SHIPPED | OUTPATIENT
Start: 2023-06-01 | End: 2023-06-04

## 2023-06-01 RX ORDER — CEPHALEXIN 500 MG/1
500 CAPSULE ORAL
Status: COMPLETED | OUTPATIENT
Start: 2023-06-01 | End: 2023-06-01

## 2023-06-01 RX ORDER — CEPHALEXIN 500 MG/1
500 CAPSULE ORAL 2 TIMES DAILY
Qty: 14 CAPSULE | Refills: 0 | Status: SHIPPED | OUTPATIENT
Start: 2023-06-01 | End: 2023-06-08

## 2023-06-01 RX ADMIN — CEPHALEXIN 500 MG: 500 CAPSULE ORAL at 21:01

## 2023-06-01 RX ADMIN — SODIUM CHLORIDE 1000 ML: 9 INJECTION, SOLUTION INTRAVENOUS at 19:40

## 2023-06-01 ASSESSMENT — ENCOUNTER SYMPTOMS
CHEST TIGHTNESS: 0
PHOTOPHOBIA: 0
BACK PAIN: 0
COLOR CHANGE: 0
VOMITING: 0
SHORTNESS OF BREATH: 0
VOICE CHANGE: 0
EYE REDNESS: 0

## 2023-06-01 ASSESSMENT — LIFESTYLE VARIABLES
HOW MANY STANDARD DRINKS CONTAINING ALCOHOL DO YOU HAVE ON A TYPICAL DAY: PATIENT DOES NOT DRINK
HOW OFTEN DO YOU HAVE A DRINK CONTAINING ALCOHOL: NEVER

## 2023-06-01 NOTE — ED TRIAGE NOTES
Pt arrives via ems from home c/o fall after working outside. Pt says she did lose consciousness and hit her head. Denies blood thinners. Obvious hematoma to forehead. Pt says this is the third fall she's had this year.

## 2023-06-01 NOTE — ED PROVIDER NOTES
placed in this encounter. 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    Diverticulitis     DJD (degenerative joint disease) 9/6/2013    Gastrointestinal disorder     GERD    GERD (gastroesophageal reflux disease)     controlled with meds     Hypertension     controlled with meds     Psychiatric disorder     anxiety/depression     Thyroid disease     hx goiter - thyroidectomy - pt on no meds at this time         Past Surgical History:   Procedure Laterality Date    CHOLECYSTECTOMY      GI      cholecystectomy     GYN      hysterectomy complete     HEENT      tonsilectomy     HEENT      thyroidectomy     LITHOTRIPSY      NEUROLOGICAL SURGERY      back surgery x 4 with breanne & screws     ORTHOPEDIC SURGERY      right hip replacement         Social History     Socioeconomic History    Marital status:    Tobacco Use    Smoking status: Never    Smokeless tobacco: Never   Substance and Sexual Activity    Alcohol use: No    Drug use: No        Previous Medications    AMLODIPINE (NORVASC) 2.5 MG TABLET    Take 2.5 mg by mouth daily    ASPIRIN 81 MG EC TABLET    Take 81 mg by mouth daily    CYANOCOBALAMIN 500 MCG TABLET    Take 500 mcg by mouth daily    DICLOFENAC SODIUM (VOLTAREN) 1 % GEL    Apply topically    FLUOXETINE HCL, PMDD, 20 MG TABS    Take 20 mg by mouth daily    GABAPENTIN (NEURONTIN) 300 MG CAPSULE    Take 300 mg by mouth 2 times daily. NALOXEGOL (MOVANTIK) 25 MG TABS TABLET    Take by mouth every morning (before breakfast)    OMEPRAZOLE (PRILOSEC) 20 MG DELAYED RELEASE CAPSULE    Take 20 mg by mouth daily    ONDANSETRON (ZOFRAN) 4 MG TABLET    Take 4 mg by mouth every 8 hours as needed    OXYCODONE-ACETAMINOPHEN (PERCOCET) 7.5-325 MG PER TABLET    Take 1 tablet by mouth every 4 hours as needed.         No results found for any

## 2023-06-04 LAB
BACTERIA SPEC CULT: NORMAL
SERVICE CMNT-IMP: NORMAL

## 2023-06-15 PROBLEM — M18.12 ARTHRITIS OF CARPOMETACARPAL (CMC) JOINT OF LEFT THUMB: Status: ACTIVE | Noted: 2023-06-15

## 2023-06-15 PROBLEM — G56.02 LEFT CARPAL TUNNEL SYNDROME: Status: ACTIVE | Noted: 2023-06-15

## 2023-06-15 PROBLEM — S63.502A WRIST SPRAIN, LEFT, INITIAL ENCOUNTER: Status: ACTIVE | Noted: 2023-06-15

## 2023-06-29 ENCOUNTER — OFFICE VISIT (OUTPATIENT)
Dept: ORTHOPEDIC SURGERY | Age: 83
End: 2023-06-29

## 2023-06-29 DIAGNOSIS — S63.502A WRIST SPRAIN, LEFT, INITIAL ENCOUNTER: ICD-10-CM

## 2023-06-29 DIAGNOSIS — M18.12 ARTHRITIS OF CARPOMETACARPAL (CMC) JOINT OF LEFT THUMB: Primary | ICD-10-CM

## 2023-06-29 RX ORDER — MELOXICAM 15 MG/1
15 TABLET ORAL DAILY
Qty: 28 TABLET | Refills: 0 | Status: SHIPPED | OUTPATIENT
Start: 2023-06-29 | End: 2023-07-27

## 2023-06-29 RX ORDER — METHYLPREDNISOLONE ACETATE 40 MG/ML
40 INJECTION, SUSPENSION INTRA-ARTICULAR; INTRALESIONAL; INTRAMUSCULAR; SOFT TISSUE ONCE
Status: COMPLETED | OUTPATIENT
Start: 2023-06-29 | End: 2023-06-29

## 2023-06-29 RX ADMIN — METHYLPREDNISOLONE ACETATE 40 MG: 40 INJECTION, SUSPENSION INTRA-ARTICULAR; INTRALESIONAL; INTRAMUSCULAR; SOFT TISSUE at 13:19

## 2023-11-13 ENCOUNTER — OFFICE VISIT (OUTPATIENT)
Dept: ORTHOPEDIC SURGERY | Age: 83
End: 2023-11-13

## 2023-11-13 VITALS — WEIGHT: 154.5 LBS | BODY MASS INDEX: 22.88 KG/M2 | HEIGHT: 69 IN

## 2023-11-13 DIAGNOSIS — M25.561 ACUTE PAIN OF RIGHT KNEE: Primary | ICD-10-CM

## 2023-11-13 DIAGNOSIS — M17.11 PRIMARY OSTEOARTHRITIS OF RIGHT KNEE: ICD-10-CM

## 2023-11-13 RX ORDER — METHYLPREDNISOLONE ACETATE 40 MG/ML
40 INJECTION, SUSPENSION INTRA-ARTICULAR; INTRALESIONAL; INTRAMUSCULAR; SOFT TISSUE ONCE
Status: COMPLETED | OUTPATIENT
Start: 2023-11-13 | End: 2023-11-13

## 2023-11-13 RX ADMIN — METHYLPREDNISOLONE ACETATE 40 MG: 40 INJECTION, SUSPENSION INTRA-ARTICULAR; INTRALESIONAL; INTRAMUSCULAR; SOFT TISSUE at 11:20

## 2023-11-13 NOTE — PROGRESS NOTES
Take 1 tablet by mouth every 4 hours as needed. , Disp: , Rfl:   No Known Allergies    CC:right knee pain    Impression: Osteoarthritis of the right knee    Procedure: Depomedrol injection     Procedure Note: The right knee was prepped with alcohol. Then the right knee was injected with 1 mL of 0.5% Marcaine and 40mg of depomedrol The patient tolerated the procedure without difficulty.       Madyson Johns MD

## 2023-12-04 ENCOUNTER — OFFICE VISIT (OUTPATIENT)
Dept: ORTHOPEDIC SURGERY | Age: 83
End: 2023-12-04
Payer: MEDICARE

## 2023-12-04 DIAGNOSIS — M17.11 PRIMARY OSTEOARTHRITIS OF RIGHT KNEE: Primary | ICD-10-CM

## 2023-12-04 PROCEDURE — 20611 DRAIN/INJ JOINT/BURSA W/US: CPT | Performed by: PHYSICIAN ASSISTANT

## 2023-12-04 NOTE — PROGRESS NOTES
Name: Isabela Espino  YOB: 1940  Gender: female  MRN: [id number]     CC: RIGHT Knee Osteoarthritis      PROCEDURE: #1 of 3 Hyaluronic Injection    After discussion of risks and benefits including but not limited to pain, infection, skin discoloration, and injury to blood vessels or nerves the patient verbally consented to proceed with injection of the RIGHT. The patient is to restrict their activity for 48 hours. Radiology Report: US guidance was used to examine the joint, ensure adequate needle placement and to decrease the risk of joint aggravation. The intracondylar notch, retropatellar fat pad, patella tendon, patella and tibia were all visualized. Pre and post injection US still images were obtained and placed in the record. Image were obtained with a [employer] ultrasound transducer (model [id number]). Procedure Note: After steriley prepping the RIGHT knee, it was injected with a 2mL of Synvisc and the medication was observed going into the intra-articular space via US guidance. The patient tolerated the procedure without difficulty.     JUNO Quispe

## 2023-12-11 ENCOUNTER — OFFICE VISIT (OUTPATIENT)
Dept: ORTHOPEDIC SURGERY | Age: 83
End: 2023-12-11
Payer: MEDICARE

## 2023-12-11 DIAGNOSIS — M17.11 PRIMARY OSTEOARTHRITIS OF RIGHT KNEE: Primary | ICD-10-CM

## 2023-12-11 DIAGNOSIS — M25.561 ACUTE PAIN OF RIGHT KNEE: ICD-10-CM

## 2023-12-11 PROCEDURE — 20611 DRAIN/INJ JOINT/BURSA W/US: CPT | Performed by: PHYSICIAN ASSISTANT

## 2023-12-11 RX ORDER — HYALURONATE SODIUM 10 MG/ML
20 SYRINGE (ML) INTRAARTICULAR ONCE
Status: SHIPPED | OUTPATIENT
Start: 2023-12-11

## 2023-12-11 NOTE — PROGRESS NOTES
Name: Lay Douglass  YOB: 1940  Gender: female  MRN: [id number]     CC: RIGHT Knee Osteoarthritis      PROCEDURE: #2 of 3 Hyaluronic Injection    After discussion of risks and benefits including but not limited to pain, infection, skin discoloration, and injury to blood vessels or nerves the patient verbally consented to proceed with injection of the RIGHT. The patient is to restrict their activity for 48 hours. Radiology Report: US guidance was used to examine the joint, ensure adequate needle placement and to decrease the risk of joint aggravation. The intracondylar notch, retropatellar fat pad, patella tendon, patella and tibia were all visualized. Pre and post injection US still images were obtained and placed in the record. Image were obtained with a [employer] ultrasound transducer (model [id number]). Procedure Note: After steriley prepping the RIGHT knee, it was injected with a 2mL of Synvisc and the medication was observed going into the intra-articular space via US guidance. The patient tolerated the procedure without difficulty.     JUNO Retana

## 2023-12-12 ENCOUNTER — OFFICE VISIT (OUTPATIENT)
Dept: ORTHOPEDIC SURGERY | Age: 83
End: 2023-12-12
Payer: MEDICARE

## 2023-12-12 DIAGNOSIS — M18.12 ARTHRITIS OF CARPOMETACARPAL (CMC) JOINT OF LEFT THUMB: Primary | ICD-10-CM

## 2023-12-12 PROCEDURE — 99214 OFFICE O/P EST MOD 30 MIN: CPT | Performed by: ORTHOPAEDIC SURGERY

## 2023-12-12 PROCEDURE — 20600 DRAIN/INJ JOINT/BURSA W/O US: CPT | Performed by: ORTHOPAEDIC SURGERY

## 2023-12-12 PROCEDURE — G8428 CUR MEDS NOT DOCUMENT: HCPCS | Performed by: ORTHOPAEDIC SURGERY

## 2023-12-12 PROCEDURE — G8420 CALC BMI NORM PARAMETERS: HCPCS | Performed by: ORTHOPAEDIC SURGERY

## 2023-12-12 PROCEDURE — L3924 HFO WITHOUT JOINTS PRE OTS: HCPCS | Performed by: ORTHOPAEDIC SURGERY

## 2023-12-12 PROCEDURE — G8400 PT W/DXA NO RESULTS DOC: HCPCS | Performed by: ORTHOPAEDIC SURGERY

## 2023-12-12 PROCEDURE — 1090F PRES/ABSN URINE INCON ASSESS: CPT | Performed by: ORTHOPAEDIC SURGERY

## 2023-12-12 PROCEDURE — G8484 FLU IMMUNIZE NO ADMIN: HCPCS | Performed by: ORTHOPAEDIC SURGERY

## 2023-12-12 PROCEDURE — 1036F TOBACCO NON-USER: CPT | Performed by: ORTHOPAEDIC SURGERY

## 2023-12-12 PROCEDURE — 1123F ACP DISCUSS/DSCN MKR DOCD: CPT | Performed by: ORTHOPAEDIC SURGERY

## 2023-12-12 RX ORDER — BETAMETHASONE SODIUM PHOSPHATE AND BETAMETHASONE ACETATE 3; 3 MG/ML; MG/ML
6 INJECTION, SUSPENSION INTRA-ARTICULAR; INTRALESIONAL; INTRAMUSCULAR; SOFT TISSUE ONCE
Status: COMPLETED | OUTPATIENT
Start: 2023-12-12 | End: 2023-12-12

## 2023-12-12 RX ORDER — MELOXICAM 7.5 MG/1
7.5 TABLET ORAL 2 TIMES DAILY
Qty: 30 TABLET | Refills: 1 | Status: SHIPPED | OUTPATIENT
Start: 2023-12-12 | End: 2024-02-10

## 2023-12-12 RX ADMIN — BETAMETHASONE SODIUM PHOSPHATE AND BETAMETHASONE ACETATE 6 MG: 3; 3 INJECTION, SUSPENSION INTRA-ARTICULAR; INTRALESIONAL; INTRAMUSCULAR; SOFT TISSUE at 14:40

## 2023-12-12 NOTE — PROGRESS NOTES
Patient was fit for a CMC splint for patients left hand/joint. I demonstrated that the thumb slides into the opening and Velcro on the dorsal side of the hand. The strap continues around the thumb and Velcro's again on the ventral side of hand or palm, and then continues through the thumb and first finger to Velcro again on the dorsal side of hand. Patient read and signed documenting they understand and agree to PO's current DME return policy. Patient read and signed documenting they understand and agree to [facility]'s current DME return policy.

## 2023-12-12 NOTE — PROGRESS NOTES
Orthopaedic Hand Surgery Note    Name: Henri Box  Age: 78 y.o. YOB: 1940  Gender: female  MRN: [id number]    CC: Follow up for thumb Carpometacarpal Joint osteoarthritis    HPI: Patient is a 78 y.o. female who is here regarding follow up for thumb CMC osteoarthritis. Patient has tried braces and anti-inflammatories without much relief. ROS/Meds/PSH/PMH/FH/SH: I personally reviewed the patients standard intake form. Pertinents are discussed in the HPI    Physical Examination:  General: Awake and alert. HEENT: Normocephalic, atraumatic  CV/Pulm: Breathing even and unlabored  Skin: No obvious rashes noted. Lymphatic: No obvious evidence of lymphedema or lymphadenopathy    Musculoskeletal:   Examination of the left upper extremity demonstrates normal sensation in median, ulnar and radial distribution, negative carpal tunnel compression and Phalen test, cap refill < 5 seconds in all fingers. Mild prominence and instability of the base of the first metacarpal. CMC grind is positive for pain and crepitus. Thumb MCP joint hyperextends 5 degrees. No pain at the radial styloid. Imaging / Electrodiagnostic Tests:     none    Assessment:   1. Arthritis of carpometacarpal (CMC) joint of left thumb        Plan:  We discussed the diagnosis and different treatment options. We discussed observation, day/night splinting, cortisone injection(s) and surgical reconstruction with trapeziectomy and suspension arthroplasty and the risks and benefits of all were clearly outlined. We discussed the fact that the vast majority of thumb CMC arthritis causes persistent chronic pain and that surgical reconstruction is the endpoint for patients whose symptoms are not properly alleviated with conservative measures such as injection, NSAIDs and bracing, most patients obtaining good long-lasting relief with these measures but some patients end up having surgery.  After discussing all options in detail the patient

## 2024-03-16 ENCOUNTER — TRANSCRIBE ORDERS (OUTPATIENT)
Dept: SCHEDULING | Age: 84
End: 2024-03-16

## 2024-03-16 DIAGNOSIS — Z12.31 ENCOUNTER FOR SCREENING MAMMOGRAM FOR MALIGNANT NEOPLASM OF BREAST: Primary | ICD-10-CM

## 2024-03-22 ENCOUNTER — HOSPITAL ENCOUNTER (EMERGENCY)
Age: 84
Discharge: HOME | End: 2024-03-22
Attending: EMERGENCY MEDICINE
Payer: MEDICARE

## 2024-03-22 ENCOUNTER — APPOINTMENT (OUTPATIENT)
Dept: CT IMAGING | Age: 84
End: 2024-03-22
Payer: MEDICARE

## 2024-03-22 VITALS
WEIGHT: 155 LBS | HEIGHT: 69 IN | RESPIRATION RATE: 14 BRPM | TEMPERATURE: 98.3 F | BODY MASS INDEX: 22.96 KG/M2 | OXYGEN SATURATION: 98 % | SYSTOLIC BLOOD PRESSURE: 155 MMHG | HEART RATE: 74 BPM | DIASTOLIC BLOOD PRESSURE: 76 MMHG

## 2024-03-22 DIAGNOSIS — M50.90 CERVICAL DISC DISEASE: ICD-10-CM

## 2024-03-22 DIAGNOSIS — G44.209 TENSION HEADACHE: Primary | ICD-10-CM

## 2024-03-22 DIAGNOSIS — M18.12 ARTHRITIS OF CARPOMETACARPAL (CMC) JOINT OF LEFT THUMB: ICD-10-CM

## 2024-03-22 DIAGNOSIS — S63.502A WRIST SPRAIN, LEFT, INITIAL ENCOUNTER: ICD-10-CM

## 2024-03-22 LAB
ANION GAP SERPL CALC-SCNC: 4 MMOL/L (ref 2–11)
BASOPHILS # BLD: 0 K/UL (ref 0–0.2)
BASOPHILS NFR BLD: 0 % (ref 0–2)
BUN SERPL-MCNC: 18 MG/DL (ref 8–23)
CALCIUM SERPL-MCNC: 9.9 MG/DL (ref 8.3–10.4)
CHLORIDE SERPL-SCNC: 107 MMOL/L (ref 103–113)
CO2 SERPL-SCNC: 29 MMOL/L (ref 21–32)
CREAT SERPL-MCNC: 1.2 MG/DL (ref 0.6–1)
CRP SERPL-MCNC: <0.3 MG/DL (ref 0–0.9)
DIFFERENTIAL METHOD BLD: NORMAL
EOSINOPHIL # BLD: 0.2 K/UL (ref 0–0.8)
EOSINOPHIL NFR BLD: 2 % (ref 0.5–7.8)
ERYTHROCYTE [DISTWIDTH] IN BLOOD BY AUTOMATED COUNT: 12.5 % (ref 11.9–14.6)
GLUCOSE SERPL-MCNC: 110 MG/DL (ref 65–100)
HCT VFR BLD AUTO: 40.7 % (ref 35.8–46.3)
HGB BLD-MCNC: 13.7 G/DL (ref 11.7–15.4)
IMM GRANULOCYTES # BLD AUTO: 0 K/UL (ref 0–0.5)
IMM GRANULOCYTES NFR BLD AUTO: 0 % (ref 0–5)
LYMPHOCYTES # BLD: 3.3 K/UL (ref 0.5–4.6)
LYMPHOCYTES NFR BLD: 31 % (ref 13–44)
MCH RBC QN AUTO: 31.4 PG (ref 26.1–32.9)
MCHC RBC AUTO-ENTMCNC: 33.7 G/DL (ref 31.4–35)
MCV RBC AUTO: 93.3 FL (ref 82–102)
MONOCYTES # BLD: 1 K/UL (ref 0.1–1.3)
MONOCYTES NFR BLD: 9 % (ref 4–12)
NEUTS SEG # BLD: 6.1 K/UL (ref 1.7–8.2)
NEUTS SEG NFR BLD: 58 % (ref 43–78)
NRBC # BLD: 0 K/UL (ref 0–0.2)
PLATELET # BLD AUTO: 194 K/UL (ref 150–450)
PMV BLD AUTO: 9.4 FL (ref 9.4–12.3)
POTASSIUM SERPL-SCNC: 3.5 MMOL/L (ref 3.5–5.1)
RBC # BLD AUTO: 4.36 M/UL (ref 4.05–5.2)
SODIUM SERPL-SCNC: 140 MMOL/L (ref 136–146)
WBC # BLD AUTO: 10.6 K/UL (ref 4.3–11.1)

## 2024-03-22 PROCEDURE — 70450 CT HEAD/BRAIN W/O DYE: CPT

## 2024-03-22 PROCEDURE — 6370000000 HC RX 637 (ALT 250 FOR IP): Performed by: EMERGENCY MEDICINE

## 2024-03-22 PROCEDURE — 6360000002 HC RX W HCPCS: Performed by: EMERGENCY MEDICINE

## 2024-03-22 PROCEDURE — 96374 THER/PROPH/DIAG INJ IV PUSH: CPT

## 2024-03-22 PROCEDURE — 85025 COMPLETE CBC W/AUTO DIFF WBC: CPT

## 2024-03-22 PROCEDURE — 72125 CT NECK SPINE W/O DYE: CPT

## 2024-03-22 PROCEDURE — 86140 C-REACTIVE PROTEIN: CPT

## 2024-03-22 PROCEDURE — 80048 BASIC METABOLIC PNL TOTAL CA: CPT

## 2024-03-22 PROCEDURE — 99284 EMERGENCY DEPT VISIT MOD MDM: CPT

## 2024-03-22 RX ORDER — METHYLPREDNISOLONE 4 MG/1
TABLET ORAL
Qty: 1 KIT | Refills: 0 | Status: SHIPPED | OUTPATIENT
Start: 2024-03-22

## 2024-03-22 RX ORDER — HYDROCODONE BITARTRATE AND ACETAMINOPHEN 5; 325 MG/1; MG/1
1 TABLET ORAL
Status: COMPLETED | OUTPATIENT
Start: 2024-03-22 | End: 2024-03-22

## 2024-03-22 RX ORDER — PREDNISONE 10 MG/1
40 TABLET ORAL DAILY
Status: DISCONTINUED | OUTPATIENT
Start: 2024-03-22 | End: 2024-03-22 | Stop reason: HOSPADM

## 2024-03-22 RX ORDER — KETOROLAC TROMETHAMINE 15 MG/ML
15 INJECTION, SOLUTION INTRAMUSCULAR; INTRAVENOUS
Status: COMPLETED | OUTPATIENT
Start: 2024-03-22 | End: 2024-03-22

## 2024-03-22 RX ORDER — ORPHENADRINE CITRATE 100 MG/1
100 TABLET, EXTENDED RELEASE ORAL 2 TIMES DAILY
Qty: 20 TABLET | Refills: 0 | Status: SHIPPED | OUTPATIENT
Start: 2024-03-22 | End: 2024-04-01

## 2024-03-22 RX ADMIN — KETOROLAC TROMETHAMINE 15 MG: 15 INJECTION, SOLUTION INTRAMUSCULAR; INTRAVENOUS at 06:52

## 2024-03-22 RX ADMIN — HYDROCODONE BITARTRATE AND ACETAMINOPHEN 1 TABLET: 5; 325 TABLET ORAL at 07:58

## 2024-03-22 RX ADMIN — PREDNISONE 40 MG: 10 TABLET ORAL at 07:58

## 2024-03-22 ASSESSMENT — PAIN DESCRIPTION - DESCRIPTORS
DESCRIPTORS: THROBBING
DESCRIPTORS: ACHING

## 2024-03-22 ASSESSMENT — PAIN - FUNCTIONAL ASSESSMENT: PAIN_FUNCTIONAL_ASSESSMENT: 0-10

## 2024-03-22 ASSESSMENT — PAIN SCALES - GENERAL
PAINLEVEL_OUTOF10: 8
PAINLEVEL_OUTOF10: 8

## 2024-03-22 ASSESSMENT — PAIN DESCRIPTION - LOCATION
LOCATION: HEAD
LOCATION: HEAD

## 2024-03-22 ASSESSMENT — LIFESTYLE VARIABLES
HOW OFTEN DO YOU HAVE A DRINK CONTAINING ALCOHOL: NEVER
HOW MANY STANDARD DRINKS CONTAINING ALCOHOL DO YOU HAVE ON A TYPICAL DAY: PATIENT DOES NOT DRINK

## 2024-03-22 ASSESSMENT — ENCOUNTER SYMPTOMS: BACK PAIN: 1

## 2024-03-22 NOTE — ED NOTES
I have reviewed discharge instructions with the patient.  The patient verbalized understanding.    Patient left ED via Discharge Method: ambulatory to Home with family.    Opportunity for questions and clarification provided.       Patient given 0 scripts.         To continue your aftercare when you leave the hospital, you may receive an automated call from our care team to check in on how you are doing.  This is a free service and part of our promise to provide the best care and service to meet your aftercare needs.” If you have questions, or wish to unsubscribe from this service please call [phone].  Thank you for Choosing our [facility] Emergency Department.       Shiva Ward RN  03/22/24 6032

## 2024-03-22 NOTE — ED PROVIDER NOTES
medical records.       ROS     Review of Systems   Constitutional:  Negative for chills and fever.   Musculoskeletal:  Positive for back pain and neck pain.   Neurological:  Positive for headaches.   All other systems reviewed and are negative.       Physical Exam     Vitals signs and nursing note reviewed:  Vitals:    03/22/24 0552 03/22/24 0632   BP: (!) 176/97 (!) 174/78   Pulse: (!) 102 89   Resp: 18 12   Temp: 98.3 °F (36.8 °C)    TempSrc: Oral    SpO2: 98% 98%      Physical Exam  Vitals and nursing note reviewed.   Constitutional:       General: She is not in acute distress.     Appearance: Normal appearance. She is not ill-appearing or toxic-appearing.   HENT:      Head: Normocephalic and atraumatic.      Mouth/Throat:      Mouth: Mucous membranes are moist.      Pharynx: Oropharynx is clear. No oropharyngeal exudate or posterior oropharyngeal erythema.   Eyes:      Extraocular Movements: Extraocular movements intact.      Conjunctiva/sclera: Conjunctivae normal.      Pupils: Pupils are equal, round, and reactive to light.   Neck:      Vascular: No carotid bruit.      Comments: Positive Spurling test.  Also tenderness to the suboccipital area bilaterally as well as the bilateral posterior aspect of the neck.  Cardiovascular:      Rate and Rhythm: Normal rate and regular rhythm.      Heart sounds: Normal heart sounds.   Pulmonary:      Effort: Pulmonary effort is normal.      Breath sounds: Normal breath sounds.   Musculoskeletal:         General: Tenderness present. Normal range of motion.      Cervical back: Normal range of motion and neck supple. Tenderness present. No rigidity.      Comments: Tenderness present with trigger points to the left trapezius.  Headache and neck pain is also worsened with passive range of motion of the left upper extremity   Lymphadenopathy:      Cervical: No cervical adenopathy.   Skin:     General: Skin is warm and dry.      Capillary Refill: Capillary refill takes less than 2

## 2024-03-22 NOTE — ED TRIAGE NOTES
Patient arrives ambulatory to triage with support person. Reports headache that began this morning. States pain is at the back of her head and radiates to left shoulder. Checked BP at home and BP was 190 systolic. Reports taking BP medication every day as prescribed. Endorses \"a little\" blurry vision and decreased sensation. States she thought she was having a stroke so came to ER. NIH of 1 in triage.

## 2024-06-26 ENCOUNTER — OFFICE VISIT (OUTPATIENT)
Dept: ORTHOPEDIC SURGERY | Age: 84
End: 2024-06-26
Payer: MEDICARE

## 2024-06-26 DIAGNOSIS — M17.11 PRIMARY OSTEOARTHRITIS OF RIGHT KNEE: Primary | ICD-10-CM

## 2024-06-26 PROCEDURE — 20611 DRAIN/INJ JOINT/BURSA W/US: CPT | Performed by: PHYSICIAN ASSISTANT

## 2024-06-26 RX ORDER — METHYLPREDNISOLONE ACETATE 40 MG/ML
40 INJECTION, SUSPENSION INTRA-ARTICULAR; INTRALESIONAL; INTRAMUSCULAR; SOFT TISSUE ONCE
Status: COMPLETED | OUTPATIENT
Start: 2024-06-26 | End: 2024-06-26

## 2024-06-26 RX ADMIN — METHYLPREDNISOLONE ACETATE 40 MG: 40 INJECTION, SUSPENSION INTRA-ARTICULAR; INTRALESIONAL; INTRAMUSCULAR; SOFT TISSUE at 14:38

## 2024-06-26 NOTE — PROGRESS NOTES
Name: Nini Apple  YOB: 1940  Gender: female  MRN: [id number]        Current Outpatient Medications:     methylPREDNISolone (MEDROL DOSEPACK) 4 MG tablet, Follow package instructions, Disp: 1 kit, Rfl: 0    meloxicam (MOBIC) 7.5 MG tablet, Take 1 tablet by mouth in the morning and at bedtime, Disp: 30 tablet, Rfl: 1    meloxicam (MOBIC) 15 MG tablet, Take 1 tablet by mouth daily for 28 days, Disp: 28 tablet, Rfl: 0    meloxicam (MOBIC) 15 MG tablet, Take 1 tablet by mouth daily, Disp: 30 tablet, Rfl: 1    amLODIPine (NORVASC) 2.5 MG tablet, Take 2.5 mg by mouth daily, Disp: , Rfl:     aspirin 81 MG EC tablet, Take 81 mg by mouth daily, Disp: , Rfl:     cyanocobalamin 500 MCG tablet, Take 500 mcg by mouth daily, Disp: , Rfl:     diclofenac sodium (VOLTAREN) 1 % GEL, Apply topically, Disp: , Rfl:     FLUoxetine HCl, PMDD, 20 MG TABS, Take 20 mg by mouth daily, Disp: , Rfl:     gabapentin (NEURONTIN) 300 MG capsule, Take 300 mg by mouth 2 times daily., Disp: , Rfl:     naloxegol (MOVANTIK) 25 MG TABS tablet, Take by mouth every morning (before breakfast), Disp: , Rfl:     omeprazole (PRILOSEC) 20 MG delayed release capsule, Take 20 mg by mouth daily, Disp: , Rfl:     ondansetron (ZOFRAN) 4 MG tablet, Take 4 mg by mouth every 8 hours as needed, Disp: , Rfl:     oxyCODONE-acetaminophen (PERCOCET) 7.5-325 MG per tablet, Take 1 tablet by mouth every 4 hours as needed., Disp: , Rfl:   No Known Allergies    CC: RIGHTknee pain    Impression: RIGHT Knee DJD    The patient's name and date of birth were confirmed prior to the injection.  The injection site was also confirmed with the patient prior to the procedure.    Procedure: Depomedrol(US) injection with US guidance    Radiology Report: Ultrasound guidance was used to examine the joint, ensure adequate needle placement and to decrease the risk of joint aggravation.  The intercondylar notch, retropatellar fat pad, patella tendon, patella and tibia were

## 2024-07-29 ENCOUNTER — OFFICE VISIT (OUTPATIENT)
Dept: ORTHOPEDIC SURGERY | Age: 84
End: 2024-07-29
Payer: MEDICARE

## 2024-07-29 DIAGNOSIS — M17.11 PRIMARY OSTEOARTHRITIS OF RIGHT KNEE: Primary | ICD-10-CM

## 2024-07-29 PROCEDURE — 20611 DRAIN/INJ JOINT/BURSA W/US: CPT | Performed by: PHYSICIAN ASSISTANT

## 2024-07-29 NOTE — PROGRESS NOTES
Name: Nini Apple  YOB: 1940  Gender: female  MRN: [id number]     CC: RIGHT Knee Osteoarthritis      PROCEDURE: #1 of 3 Hyaluronic Injection    The patient's name and date of birth were confirmed prior to the injection.  The injection site was also confirmed with the patient prior to the procedure. After discussion of risks and benefits including but not limited to pain, infection, skin discoloration, and injury to blood vessels or nerves the patient verbally consented to proceed with injection of the RIGHT.  The patient is to restrict their activity for 48 hours.    Radiology Report: US guidance was used to examine the joint, ensure adequate needle placement and to decrease the risk of joint aggravation. The intracondylar notch, retropatellar fat pad, patella tendon, patella and tibia were all visualized. Pre and post injection US still images were obtained and placed in the record. Image were obtained with a [employer] ultrasound transducer (model [id number]).    Procedure Note: After steriley prepping the RIGHT knee, it was injected with a 2mL of Synvisc and the medication was observed going into the intra-articular space via US guidance.    The patient tolerated the procedure without difficulty.    JUNO Nance

## 2024-08-12 ENCOUNTER — OFFICE VISIT (OUTPATIENT)
Dept: ORTHOPEDIC SURGERY | Age: 84
End: 2024-08-12
Payer: MEDICARE

## 2024-08-12 DIAGNOSIS — M17.11 PRIMARY OSTEOARTHRITIS OF RIGHT KNEE: Primary | ICD-10-CM

## 2024-08-12 PROCEDURE — 20611 DRAIN/INJ JOINT/BURSA W/US: CPT | Performed by: PHYSICIAN ASSISTANT

## 2024-08-12 NOTE — PROGRESS NOTES
Name: Nini Apple  YOB: 1940  Gender: female  MRN: [id number]     CC: RIGHT Knee Osteoarthritis      PROCEDURE: #2 of 3 Hyaluronic Injection    The patient's name and date of birth were confirmed prior to the injection.  The injection site was also confirmed with the patient prior to the procedure. After discussion of risks and benefits including but not limited to pain, infection, skin discoloration, and injury to blood vessels or nerves the patient verbally consented to proceed with injection of the RIGHT.  The patient is to restrict their activity for 48 hours.    Radiology Report: US guidance was used to examine the joint, ensure adequate needle placement and to decrease the risk of joint aggravation. The intracondylar notch, retropatellar fat pad, patella tendon, patella and tibia were all visualized. Pre and post injection US still images were obtained and placed in the record. Image were obtained with a [employer] ultrasound transducer (model [id number]).    Procedure Note: After steriley prepping the RIGHT knee, it was injected with a 2mL of Synvisc and the medication was observed going into the intra-articular space via US guidance.    The patient tolerated the procedure without difficulty.    JUNO Nance

## 2024-08-19 ENCOUNTER — OFFICE VISIT (OUTPATIENT)
Dept: ORTHOPEDIC SURGERY | Age: 84
End: 2024-08-19
Payer: MEDICARE

## 2024-08-19 DIAGNOSIS — M17.11 PRIMARY OSTEOARTHRITIS OF RIGHT KNEE: Primary | ICD-10-CM

## 2024-08-19 PROCEDURE — 1090F PRES/ABSN URINE INCON ASSESS: CPT | Performed by: PHYSICIAN ASSISTANT

## 2024-08-19 PROCEDURE — G8420 CALC BMI NORM PARAMETERS: HCPCS | Performed by: PHYSICIAN ASSISTANT

## 2024-08-19 PROCEDURE — 1036F TOBACCO NON-USER: CPT | Performed by: ORTHOPAEDIC SURGERY

## 2024-08-19 PROCEDURE — G8400 PT W/DXA NO RESULTS DOC: HCPCS | Performed by: PHYSICIAN ASSISTANT

## 2024-08-19 PROCEDURE — 20611 DRAIN/INJ JOINT/BURSA W/US: CPT | Performed by: PHYSICIAN ASSISTANT

## 2024-08-19 PROCEDURE — G8428 CUR MEDS NOT DOCUMENT: HCPCS | Performed by: PHYSICIAN ASSISTANT

## 2024-08-19 PROCEDURE — 1123F ACP DISCUSS/DSCN MKR DOCD: CPT | Performed by: PHYSICIAN ASSISTANT

## 2024-08-19 PROCEDURE — 99214 OFFICE O/P EST MOD 30 MIN: CPT | Performed by: PHYSICIAN ASSISTANT

## 2024-08-19 NOTE — PROGRESS NOTES
discoloration, and injury to blood vessels or nerves the patient verbally consented to proceed with injection of the RIGHT.  The patient is to restrict their activity for 48 hours.    Radiology Report: US guidance was used to examine the joint, ensure adequate needle placement and to decrease the risk of joint aggravation. The intracondylar notch, retropatellar fat pad, patella tendon, patella and tibia were all visualized. Pre and post injection US still images were obtained and placed in the record. Image were obtained with a [employer] ultrasound transducer (model [id number]).    Procedure Note: After steriley prepping the RIGHT knee, it was injected with a 2mL of Synvisc and the medication was observed going into the intra-articular space via US guidance.    The patient tolerated the procedure without difficulty.    JUNO Nance     Elements of this note have been dictated using speech recognition software. As a result, errors of speech recognition may have occurred.

## 2024-11-12 DIAGNOSIS — M17.11 PRIMARY OSTEOARTHRITIS OF RIGHT KNEE: Primary | ICD-10-CM

## 2024-12-20 ENCOUNTER — PREP FOR PROCEDURE (OUTPATIENT)
Dept: ORTHOPEDIC SURGERY | Age: 84
End: 2024-12-20

## 2024-12-20 DIAGNOSIS — M17.11 PRIMARY OSTEOARTHRITIS OF RIGHT KNEE: Primary | ICD-10-CM

## 2024-12-20 RX ORDER — ACETAMINOPHEN 325 MG/1
1000 TABLET ORAL ONCE
Status: CANCELLED | OUTPATIENT
Start: 2024-12-20 | End: 2024-12-20

## 2025-01-07 ENCOUNTER — HOSPITAL ENCOUNTER (OUTPATIENT)
Dept: SURGERY | Age: 85
Discharge: HOME | End: 2025-01-10
Payer: MEDICARE

## 2025-01-07 ENCOUNTER — HOSPITAL ENCOUNTER (OUTPATIENT)
Dept: REHABILITATION | Age: 85
Discharge: HOME | End: 2025-01-10
Payer: MEDICARE

## 2025-01-07 VITALS
DIASTOLIC BLOOD PRESSURE: 84 MMHG | HEART RATE: 112 BPM | SYSTOLIC BLOOD PRESSURE: 133 MMHG | WEIGHT: 146 LBS | BODY MASS INDEX: 21.62 KG/M2 | OXYGEN SATURATION: 97 % | RESPIRATION RATE: 16 BRPM | TEMPERATURE: 97.5 F | HEIGHT: 69 IN

## 2025-01-07 DIAGNOSIS — M17.11 PRIMARY OSTEOARTHRITIS OF RIGHT KNEE: ICD-10-CM

## 2025-01-07 LAB
ALBUMIN SERPL-MCNC: 3.8 G/DL (ref 3.2–4.6)
ALBUMIN/GLOB SERPL: 1.3 (ref 1–1.9)
ALP SERPL-CCNC: 74 U/L (ref 35–104)
ALT SERPL-CCNC: 19 U/L (ref 8–45)
ANION GAP SERPL CALC-SCNC: 9 MMOL/L (ref 7–16)
AST SERPL-CCNC: 25 U/L (ref 15–37)
BASOPHILS # BLD: 0.04 K/UL (ref 0–0.2)
BASOPHILS NFR BLD: 0.6 % (ref 0–2)
BILIRUB SERPL-MCNC: 0.6 MG/DL (ref 0–1.2)
BUN SERPL-MCNC: 18 MG/DL (ref 8–23)
CALCIUM SERPL-MCNC: 10.1 MG/DL (ref 8.8–10.2)
CHLORIDE SERPL-SCNC: 104 MMOL/L (ref 98–107)
CO2 SERPL-SCNC: 26 MMOL/L (ref 20–29)
CREAT SERPL-MCNC: 1.22 MG/DL (ref 0.6–1.1)
DIFFERENTIAL METHOD BLD: NORMAL
EKG ATRIAL RATE: 110 BPM
EKG DIAGNOSIS: NORMAL
EKG P AXIS: 61 DEGREES
EKG P-R INTERVAL: 188 MS
EKG Q-T INTERVAL: 300 MS
EKG QRS DURATION: 72 MS
EKG QTC CALCULATION (BAZETT): 406 MS
EKG R AXIS: 82 DEGREES
EKG T AXIS: 56 DEGREES
EKG VENTRICULAR RATE: 110 BPM
EOSINOPHIL # BLD: 0.13 K/UL (ref 0–0.8)
EOSINOPHIL NFR BLD: 2.1 % (ref 0.5–7.8)
ERYTHROCYTE [DISTWIDTH] IN BLOOD BY AUTOMATED COUNT: 12.4 % (ref 11.9–14.6)
EST. AVERAGE GLUCOSE BLD GHB EST-MCNC: 108 MG/DL
GLOBULIN SER CALC-MCNC: 2.9 G/DL (ref 2.3–3.5)
GLUCOSE SERPL-MCNC: 114 MG/DL (ref 70–99)
HBA1C MFR BLD: 5.4 % (ref 0–5.6)
HCT VFR BLD AUTO: 41.8 % (ref 35.8–46.3)
HGB BLD-MCNC: 13.7 G/DL (ref 11.7–15.4)
IMM GRANULOCYTES # BLD AUTO: 0.01 K/UL (ref 0–0.5)
IMM GRANULOCYTES NFR BLD AUTO: 0.2 % (ref 0–5)
INR PPP: 1
LYMPHOCYTES # BLD: 2.61 K/UL (ref 0.5–4.6)
LYMPHOCYTES NFR BLD: 42 % (ref 13–44)
MCH RBC QN AUTO: 30.9 PG (ref 26.1–32.9)
MCHC RBC AUTO-ENTMCNC: 32.8 G/DL (ref 31.4–35)
MCV RBC AUTO: 94.1 FL (ref 82–102)
MONOCYTES # BLD: 0.48 K/UL (ref 0.1–1.3)
MONOCYTES NFR BLD: 7.7 % (ref 4–12)
MRSA DNA SPEC QL NAA+PROBE: NOT DETECTED
NEUTS SEG # BLD: 2.94 K/UL (ref 1.7–8.2)
NEUTS SEG NFR BLD: 47.4 % (ref 43–78)
NRBC # BLD: 0 K/UL (ref 0–0.2)
PLATELET # BLD AUTO: 210 K/UL (ref 150–450)
PMV BLD AUTO: 9.8 FL (ref 9.4–12.3)
POTASSIUM SERPL-SCNC: 4.1 MMOL/L (ref 3.5–5.1)
PROT SERPL-MCNC: 6.6 G/DL (ref 6.3–8.2)
PROTHROMBIN TIME: 13.7 SEC (ref 11.3–14.9)
RBC # BLD AUTO: 4.44 M/UL (ref 4.05–5.2)
S AUREUS CPE NOSE QL NAA+PROBE: DETECTED
SODIUM SERPL-SCNC: 139 MMOL/L (ref 136–145)
WBC # BLD AUTO: 6.2 K/UL (ref 4.3–11.1)

## 2025-01-07 PROCEDURE — 93005 ELECTROCARDIOGRAM TRACING: CPT | Performed by: ORTHOPAEDIC SURGERY

## 2025-01-07 PROCEDURE — 94760 N-INVAS EAR/PLS OXIMETRY 1: CPT

## 2025-01-07 PROCEDURE — 98960 EDU&TRN PT SELF-MGMT NQHP 1: CPT

## 2025-01-07 PROCEDURE — 85610 PROTHROMBIN TIME: CPT

## 2025-01-07 PROCEDURE — 83036 HEMOGLOBIN GLYCOSYLATED A1C: CPT

## 2025-01-07 PROCEDURE — 93010 ELECTROCARDIOGRAM REPORT: CPT | Performed by: INTERNAL MEDICINE

## 2025-01-07 PROCEDURE — 80053 COMPREHEN METABOLIC PANEL: CPT

## 2025-01-07 PROCEDURE — 87641 MR-STAPH DNA AMP PROBE: CPT

## 2025-01-07 PROCEDURE — 97161 PT EVAL LOW COMPLEX 20 MIN: CPT

## 2025-01-07 PROCEDURE — 85025 COMPLETE CBC W/AUTO DIFF WBC: CPT

## 2025-01-07 RX ORDER — MECOBALAMIN 5000 MCG
5 TABLET,DISINTEGRATING ORAL NIGHTLY
COMMUNITY

## 2025-01-07 ASSESSMENT — PULMONARY FUNCTION TESTS
FEV1 (%PREDICTED): 80
FEV1 (LITERS): 1.53

## 2025-01-07 ASSESSMENT — PAIN SCALES - GENERAL
PAINLEVEL_OUTOF10: 10
PAINLEVEL_OUTOF10: 0

## 2025-01-07 ASSESSMENT — KOOS JR
HOW SEVERE IS YOUR KNEE STIFFNESS AFTER FIRST WAKING IN MORNING: SEVERE
KOOS JR TOTAL INTERVAL SCORE: 15.939
TWISING OR PIVOTING ON KNEE: SEVERE
BENDING TO THE FLOOR TO PICK UP OBJECT: EXTREME
STRAIGHTENING KNEE FULLY: EXTREME
RISING FROM SITTING: EXTREME
GOING UP OR DOWN STAIRS: EXTREME
STANDING UPRIGHT: EXTREME

## 2025-01-07 NOTE — PERIOP NOTE
Patient verified name and .    Order for consent was found in EHR and does match case posting; patient verified.     Type 3 surgery, JOINT assessment complete.    Labs per surgeon: CBC,CMP, A1C, PT/INR; results pending.   Labs per anesthesia protocol: no additional  EKG: Completed today; results within anesthesia guidelines.     MRSA/MSSA swab collected per policy. MD to consult pharmacy to dose Vanc if appropriate.     Hospital approved surgical skin cleanser and instructions to return bottle on DOS given per hospital policy.    Patient provided with handouts including Guide to Surgery, Pain Management, Preventing Surgical Site Infections, and [place] [facility] Brochure.    Patient answered medical/surgical history questions at their best of ability. All prior to admission medications documented in Epic. Original medication prescription bottle was not visualized during patient appointment.     Patient instructed to hold all vitamins 3 weeks prior to surgery and NSAIDS 5 days prior to surgery.     **Please drink 32 ounces of non-caffeinated clear liquids, on the day of surgery, 2 hours prior to your arrival time to avoid dehydration.       Patient teach back successful and patient demonstrates knowledge of instruction.

## 2025-01-07 NOTE — PERIOP NOTE
PLEASE CONTINUE TAKING ALL PRESCRIPTION MEDICATIONS UP TO THE DAY OF SURGERY UNLESS OTHERWISE DIRECTED BELOW.     DISCONTINUE all over the counter vitamins, supplements, and herbals 21 days prior to surgery. DISCONTINUE Non-Steroidal Anti-Inflammatory (NSAIDS) such as Advil, Ibuprofen, Motrin, Naproxen, and Aleve 5 days prior to surgery.      *IT IS OK TO TAKE TYLENOL, Allergy medication, and indigestion medications*     Prescription medications to HOLD     NONE          CONTINUE all prescriptions like normal up until the day of surgery--TAKE ONLY THE BELOW MEDICATIONS THE DAY OF SURGERY.    Oxycodone/Acetaminophen if needed                      Omeprazole   Gabapentin if needed   Amlodipine      Comments       Bring Dynahex soap and incentive spirometer back to the hospital.       Please do not bring home medications with you on the day of surgery unless otherwise directed by your nurse.  If you are instructed to bring home medications, please give them to your nurse as they will be administered by the nursing staff.     If you have any questions, please call [facility] [phone].     A copy of this note was provided to the patient for reference.

## 2025-01-07 NOTE — PERIOP NOTE
DAY OF SURGERY FLUID INSTRUCTIONS:      -Do not eat anything after midnight the night before surgery     -The anesthesia department would like for you to drink 32 ounces of non-caffeinated clear liquids 2 hours prior to arrival to avoid dehydration      -May have: Apple or Cranberry Juice, Gatorade, Water.      -You can have any combination of these clear liquids as long as it equals 32 ounces     -Do not place anything in your mouth for 2 full hours prior to your arrival to the hospital. This would include: Gum, Candy, Mints, Ice Chips, ect..              Above printed, reviewed, and provided to patient.

## 2025-01-07 NOTE — PROGRESS NOTES
01/07/25 1300   Treatment   Treatment Type Bedside spirometry   Breath Sounds   Breath Sounds Bilateral Clear   Oxygen Therapy/Pulse Ox   O2 Therapy Room air   Pulse (!) 112   SpO2 97 %   Pulse Oximeter Device Mode Intermittent   $Pulse Oximeter $Spot check (single)   Bedside Spirometry   FEV-1/Actual (Liters) 1.53 Liters   FEV-1/Predicted (Liters) 80 Liters     Initial respiratory Assessment completed with pt. Pt was interviewed and evaluated in [facility] prior to surgery.  Patient ID:  Nini Apple  [id number]  84 y.o.  1940  Surgeon: Dr. Aguilar  Date of Surgery: [unfilled]1/21/2025  Procedure: Total Right Knee Arthroplasty  Primary Care Physician: Kee Reyez [phone]  Specialists:    Pt taught proper COUGH technique  IS REVIEWED WITH PT AS WELL AS BENEFITS OF USING IS IN SEDENTARY PTS.  DIAPHRAGMATIC BREATHING EXERCISE INSTRUCTIONS GIVEN    History of smoking:   DENIES                 Quit date:         Secondhand smoke:FATHER    Past procedures with Oxygen desaturation or delayed awakening:DENIES     Respiratory history:DENIES SOB                                                                  Respiratory meds:  DENIES    FAMILY PRESENT:           DAUGHTER   PAST SLEEP STUDY:                    DENIES  HX OF MONIQUE:                                       DENIES  MONIQUE assessment:     DANGERS OF UNTREATED MONIQUE EXPLAINED TO PT.                                          SLEEPS ON SIDE       &      BACK          PHYSICAL EXAM   Body mass index is 21.56 kg/m².   Vitals:    01/07/25 1300   BP:    Pulse: (!) (P) 112   Resp:    Temp:    SpO2: (P) 97%     Neck circumference: 36     cm    Loud snoring:                                                       DENIES  Witnessed apnea or wakening gasping or choking:        DENIES        Awakens with headaches:                                               DENIES  Morning or daytime tiredness/ sleepiness:                            TIRED  Dry mouth or

## 2025-01-07 NOTE — PROGRESS NOTES
Nini Apple  : 1940  Primary: [facility] Complete  Secondary: MEDICAID [place] [facility] at [place] [address]  [phone]      Physical Therapy Prehab Evaluation Summary:2025   Time In/Out   PT Charge Capture  Episode     MEDICAL/REFERRING DIAGNOSIS: Unilateral primary osteoarthritis, right knee [M17.11]  REFERRING PHYSICIAN: Rodrigo Aguilar MD    Treatment Diagnosis:   Pain in Right Knee (M25.561)  Stiffness of Right Knee, Not elsewhere classified (M25.661)  Difficulty in walking, Not elsewhere classified (R26.2)    DATE OF SURGERY: 25  Assessment:   COMMENTS:  Ms. Apple is present for a Prehab Physical Therapy Assessment for their upcoming right TKA. They are here alone. Daughter was present but did not sit in during PT portion.  After discussing the surgical admission options and discharge plans, they are planning on discharging after one night in the hospital.    Patient with pretty significant valgus on the R.  She has a history of back issues.  Patient typically lives alone but her grandkids are currently staying with her.  She will have assist from her daughter after surgery.      PROBLEM LIST:   (Impacting functional limitations):  Ms. Apple presents with the following lower extremity(s) problems:  Gait  Strength  Balance  Home Exercise Program  Pain INTERVENTIONS PLANNED:   (Benefits and precautions of physical therapy have been discussed with the patient.)  Home Exercise Program  Educational Discussion       GOALS: (Goals have been discussed and agreed upon with patient.)  Discharge Goals: Time Frame: 1 Day  Patient will demonstrate independence with a home exercise program designed to increase strength, range of motion, balance, coordination, functional technique, and pain control to minimize functional deficits and optimize patient for total joint replacement.    Subjective:   Past Medical History/Comorbidities:   Ms.

## 2025-01-07 NOTE — PERIOP NOTE
The below lab results are within anesthesia guidelines, no follow-up required. Labs automatically routed to ordering provider via Epic documentation.     Latest Reference Range & Units 01/07/25 12:35   Sodium 136 - 145 mmol/L 139   Potassium 3.5 - 5.1 mmol/L 4.1   Chloride 98 - 107 mmol/L 104   CARBON DIOXIDE 20 - 29 mmol/L 26   BUN,BUNPL 8 - 23 MG/DL 18   Creatinine 0.60 - 1.10 MG/DL 1.22 (H)   Anion Gap 7 - 16 mmol/L 9   Est, Glom Filt Rate >60 ml/min/1.73m2 43 (L)   Glucose 70 - 99 mg/dL 114 (H)   Calcium 8.8 - 10.2 MG/DL 10.1   Albumin/Globulin Ratio 1.0 - 1.9   1.3   Total Protein 6.3 - 8.2 g/dL 6.6   Albumin 3.2 - 4.6 g/dL 3.8   Globulin 2.3 - 3.5 g/dL 2.9   Alkaline Phosphatase 35 - 104 U/L 74   ALT 8 - 45 U/L 19   AST 15 - 37 U/L 25   Total Bilirubin 0.0 - 1.2 MG/DL 0.6   WBC 4.3 - 11.1 K/uL 6.2   RBC 4.05 - 5.2 M/uL 4.44   Hemoglobin Quant 11.7 - 15.4 g/dL 13.7   Hematocrit 35.8 - 46.3 % 41.8   MCV 82.0 - 102.0 FL 94.1   MCH 26.1 - 32.9 PG 30.9   MCHC 31.4 - 35.0 g/dL 32.8   MPV 9.4 - 12.3 FL 9.8   RDW 11.9 - 14.6 % 12.4   Platelet Count 150 - 450 K/uL 210   Neutrophils % 43.0 - 78.0 % 47.4   Lymphocyte % 13.0 - 44.0 % 42.0   Monocytes % 4.0 - 12.0 % 7.7   Eosinophils % 0.5 - 7.8 % 2.1   Basophils % 0.0 - 2.0 % 0.6   Neutrophils Absolute 1.70 - 8.20 K/UL 2.94   Lymphocytes Absolute 0.50 - 4.60 K/UL 2.61   Monocytes Absolute 0.10 - 1.30 K/UL 0.48   Eosinophils Absolute 0.00 - 0.80 K/UL 0.13   Basophils Absolute 0.00 - 0.20 K/UL 0.04   Differential Type -   AUTOMATED   Immature Granulocytes % 0.0 - 5.0 % 0.2   Nucleated Red Blood Cells 0.0 - 0.2 K/uL 0.00   Immature Granulocytes Absolute 0.0 - 0.5 K/UL 0.01   Prothrombin Time 11.3 - 14.9 sec 13.7   INR -   1.0   MRSA/STAPH AUREUS DNA  Rpt (IP)   (H): Data is abnormally high  (L): Data is abnormally low  (IP): In Process  Rpt: View report in Results Review for more information

## 2025-01-08 NOTE — PROGRESS NOTES
Hemoglobin A1c  Order: [id number]  Status: Final result       Visible to patient: No (not released)       Next appt: 01/17/2025 at 09:40 AM in Orthopedic Surgery (JUNO Nance)       Dx: Primary osteoarthritis of right knee    0 Result Notes      Component  Ref Range & Units 1/7/25 1235   Hemoglobin A1C  0 - 5.6 % 5.4   Comment: Reference Range  Normal       <5.7%  Prediabetes  5.7-6.4%  Diabetes     >6.4%   Estimated Avg Glucose  mg/dL 108   Resulting Agency [facility]              Specimen Collected: 01/07/25 12:35 EST Last Resulted: 01/07/25 15:49 EST

## 2025-01-17 ENCOUNTER — OFFICE VISIT (OUTPATIENT)
Dept: ORTHOPEDIC SURGERY | Age: 85
End: 2025-01-17

## 2025-01-17 DIAGNOSIS — M17.11 PRIMARY OSTEOARTHRITIS OF RIGHT KNEE: Primary | ICD-10-CM

## 2025-01-17 NOTE — PROGRESS NOTES
Name: Nini Apple  YOB: 1940  Gender: female  MRN: [id number]    Pre-Op     CC: RIGHT KNEE PAIN       This patient comes in for pre-op exam prior to RIGHT TKA.  The patient has been cleared preoperatively.  I counseled the patient once again about the risks of infection, DVT formation, expected time of hospitalization, anticipated recovery time as well as rehab needs and expectations for recovery.  The patient would like to proceed and we will do so as planned. The patient was provided with pain medications as well as DVT prophylaxis to have on hand postoperatively at the time of discharge from hospital. All pertinent questions asked by the patient were answered.    AP, lateral and merchant views of the right knee are independently reviewed by me today showing severe valgus right knee DJD with bone-on-bone formation present.  Overall pression is severe right knee valgus DJD    JUNO Nance

## 2025-01-20 ENCOUNTER — TELEPHONE (OUTPATIENT)
Dept: ORTHOPEDIC SURGERY | Age: 85
End: 2025-01-20

## 2025-01-20 RX ORDER — CELECOXIB 200 MG/1
200 CAPSULE ORAL DAILY
Qty: 30 CAPSULE | Refills: 0 | Status: ON HOLD | OUTPATIENT
Start: 2025-01-20 | End: 2025-01-22 | Stop reason: HOSPADM

## 2025-01-20 RX ORDER — METHOCARBAMOL 750 MG/1
TABLET, FILM COATED ORAL
Qty: 40 TABLET | Refills: 0 | Status: SHIPPED | OUTPATIENT
Start: 2025-01-20

## 2025-01-20 RX ORDER — OXYCODONE HYDROCHLORIDE 5 MG/1
5-10 TABLET ORAL EVERY 4 HOURS PRN
Qty: 60 TABLET | Refills: 0 | Status: SHIPPED | OUTPATIENT
Start: 2025-01-20 | End: 2025-01-27

## 2025-01-20 RX ORDER — ASPIRIN 81 MG/1
81 TABLET ORAL 2 TIMES DAILY
Qty: 60 TABLET | Refills: 0 | Status: SHIPPED | OUTPATIENT
Start: 2025-01-20

## 2025-01-20 RX ORDER — ONDANSETRON 4 MG/1
4 TABLET, FILM COATED ORAL EVERY 6 HOURS PRN
Qty: 30 TABLET | Refills: 0 | Status: SHIPPED | OUTPATIENT
Start: 2025-01-20

## 2025-01-20 NOTE — TELEPHONE ENCOUNTER
Rola in calling about the Oxycodone that was called in. The patient already takes Percocet 10mg from another provider. Please call Rola.

## 2025-01-20 NOTE — H&P
H&P    Patient ID:  Nini Apple  [id number]  84 y.o.  1940  Surgeon:  Rodrigo Aguilar MD  Date of Surgery: * No surgery date entered *  Procedure: Robot assisted right Total Knee Arthroplasty  Primary Care Physician: Kee Reyez MD        Subjective:  Nini Apple is a 84 y.o. Black /  female who presents with right knee pain.  They have a history of right knee pain for several months. Symptoms worse with walking long distances and relieved with rest. Conservative treatment consisting of  activity modification and injections have not helped. The patient lives with their family. The patients goal after surgery is improved pain and function.        Past Medical History:   Diagnosis Date    Arthritis     osteoarthritis - spinal, hips, knees     Burning feet syndrome 9/23/2016    Chronic pain     CKD (chronic kidney disease)     stage 3 per patient    Diverticulitis     DJD (degenerative joint disease) 9/6/2013    Gastrointestinal disorder     GERD    GERD (gastroesophageal reflux disease)     controlled with meds     Hypertension     controlled with meds     Psychiatric disorder     anxiety/depression     Thyroid disease     hx goiter - thyroidectomy - pt on no meds at this time       Past Surgical History:   Procedure Laterality Date    CHOLECYSTECTOMY      GI      cholecystectomy     GYN      hysterectomy complete     HEENT      tonsilectomy     HEENT      thyroidectomy     LITHOTRIPSY      NEUROLOGICAL SURGERY      back surgery x 4 with breanne & screws     ORTHOPEDIC SURGERY      right hip replacement      Family History   Problem Relation Age of Onset    Diabetes Mother     Asthma Father     Cancer Sister     Heart Disease Brother     Cancer Brother     Hypertension Mother     Stroke Mother       Social History     Tobacco Use    Smoking status: Never    Smokeless tobacco: Never   Substance Use Topics    Alcohol use: No       Prior to Admission medications

## 2025-01-21 ENCOUNTER — HOSPITAL ENCOUNTER (OUTPATIENT)
Age: 85
Discharge: HOME HEALTH | End: 2025-01-22
Attending: ORTHOPAEDIC SURGERY | Admitting: ORTHOPAEDIC SURGERY
Payer: MEDICARE

## 2025-01-21 ENCOUNTER — ANESTHESIA (OUTPATIENT)
Dept: SURGERY | Age: 85
End: 2025-01-21
Payer: MEDICARE

## 2025-01-21 ENCOUNTER — ANESTHESIA EVENT (OUTPATIENT)
Dept: SURGERY | Age: 85
End: 2025-01-21
Payer: MEDICARE

## 2025-01-21 PROBLEM — M17.11 ARTHRITIS OF RIGHT KNEE: Status: ACTIVE | Noted: 2025-01-21

## 2025-01-21 PROCEDURE — 3600000005 HC SURGERY LEVEL 5 BASE: Performed by: ORTHOPAEDIC SURGERY

## 2025-01-21 PROCEDURE — 7100000001 HC PACU RECOVERY - ADDTL 15 MIN: Performed by: ORTHOPAEDIC SURGERY

## 2025-01-21 PROCEDURE — 6360000002 HC RX W HCPCS: Performed by: PHYSICIAN ASSISTANT

## 2025-01-21 PROCEDURE — 3600000015 HC SURGERY LEVEL 5 ADDTL 15MIN: Performed by: ORTHOPAEDIC SURGERY

## 2025-01-21 PROCEDURE — 2500000003 HC RX 250 WO HCPCS: Performed by: ORTHOPAEDIC SURGERY

## 2025-01-21 PROCEDURE — 2500000003 HC RX 250 WO HCPCS: Performed by: PHYSICIAN ASSISTANT

## 2025-01-21 PROCEDURE — 2709999900 HC NON-CHARGEABLE SUPPLY: Performed by: ORTHOPAEDIC SURGERY

## 2025-01-21 PROCEDURE — 64447 NJX AA&/STRD FEMORAL NRV IMG: CPT | Performed by: ANESTHESIOLOGY

## 2025-01-21 PROCEDURE — 94761 N-INVAS EAR/PLS OXIMETRY MLT: CPT

## 2025-01-21 PROCEDURE — 97530 THERAPEUTIC ACTIVITIES: CPT

## 2025-01-21 PROCEDURE — C1713 ANCHOR/SCREW BN/BN,TIS/BN: HCPCS | Performed by: ORTHOPAEDIC SURGERY

## 2025-01-21 PROCEDURE — 97165 OT EVAL LOW COMPLEX 30 MIN: CPT

## 2025-01-21 PROCEDURE — 6360000002 HC RX W HCPCS: Performed by: ORTHOPAEDIC SURGERY

## 2025-01-21 PROCEDURE — 97535 SELF CARE MNGMENT TRAINING: CPT

## 2025-01-21 PROCEDURE — 2580000003 HC RX 258: Performed by: ORTHOPAEDIC SURGERY

## 2025-01-21 PROCEDURE — 2580000003 HC RX 258: Performed by: NURSE ANESTHETIST, CERTIFIED REGISTERED

## 2025-01-21 PROCEDURE — 3700000000 HC ANESTHESIA ATTENDED CARE: Performed by: ORTHOPAEDIC SURGERY

## 2025-01-21 PROCEDURE — 20985 CPTR-ASST DIR MS PX: CPT | Performed by: ORTHOPAEDIC SURGERY

## 2025-01-21 PROCEDURE — 2720000010 HC SURG SUPPLY STERILE: Performed by: ORTHOPAEDIC SURGERY

## 2025-01-21 PROCEDURE — 6360000002 HC RX W HCPCS: Performed by: ANESTHESIOLOGY

## 2025-01-21 PROCEDURE — 7100000000 HC PACU RECOVERY - FIRST 15 MIN: Performed by: ORTHOPAEDIC SURGERY

## 2025-01-21 PROCEDURE — 27447 TOTAL KNEE ARTHROPLASTY: CPT | Performed by: ORTHOPAEDIC SURGERY

## 2025-01-21 PROCEDURE — 6360000002 HC RX W HCPCS: Performed by: NURSE ANESTHETIST, CERTIFIED REGISTERED

## 2025-01-21 PROCEDURE — 6370000000 HC RX 637 (ALT 250 FOR IP): Performed by: PHYSICIAN ASSISTANT

## 2025-01-21 PROCEDURE — 2500000003 HC RX 250 WO HCPCS: Performed by: NURSE ANESTHETIST, CERTIFIED REGISTERED

## 2025-01-21 PROCEDURE — 94760 N-INVAS EAR/PLS OXIMETRY 1: CPT

## 2025-01-21 PROCEDURE — 3700000001 HC ADD 15 MINUTES (ANESTHESIA): Performed by: ORTHOPAEDIC SURGERY

## 2025-01-21 PROCEDURE — 27447 TOTAL KNEE ARTHROPLASTY: CPT | Performed by: PHYSICIAN ASSISTANT

## 2025-01-21 PROCEDURE — 97161 PT EVAL LOW COMPLEX 20 MIN: CPT

## 2025-01-21 PROCEDURE — C1776 JOINT DEVICE (IMPLANTABLE): HCPCS | Performed by: ORTHOPAEDIC SURGERY

## 2025-01-21 DEVICE — ATTUNE PATELLA MEDIALIZED ANATOMIC 35MM CEMENTED AOX
Type: IMPLANTABLE DEVICE | Site: KNEE | Status: FUNCTIONAL
Brand: ATTUNE

## 2025-01-21 DEVICE — ATTUNE KNEE SYSTEM FEMORAL CRUCIATE RETAINING SIZE 6 RIGHT CEMENTED
Type: IMPLANTABLE DEVICE | Site: KNEE | Status: FUNCTIONAL
Brand: ATTUNE

## 2025-01-21 DEVICE — ATTUNE KNEE SYSTEM TIBIAL BASE FIXED BEARING SIZE 5 CEMENTED
Type: IMPLANTABLE DEVICE | Site: KNEE | Status: FUNCTIONAL
Brand: ATTUNE

## 2025-01-21 DEVICE — IMPLANTABLE DEVICE: Type: IMPLANTABLE DEVICE | Site: KNEE | Status: FUNCTIONAL

## 2025-01-21 DEVICE — ATTUNE KNEE SYSTEM TIBIAL INSERT FIXED BEARING MEDIAL STABILIZED RIGHT AOX 6, 6MM
Type: IMPLANTABLE DEVICE | Site: KNEE | Status: FUNCTIONAL
Brand: ATTUNE

## 2025-01-21 DEVICE — KNEE K1 TOT HEMI STD CEM IMPL CAPPED SYNTHES: Type: IMPLANTABLE DEVICE | Status: FUNCTIONAL

## 2025-01-21 RX ORDER — DEXAMETHASONE SODIUM PHOSPHATE 4 MG/ML
INJECTION, SOLUTION INTRA-ARTICULAR; INTRALESIONAL; INTRAMUSCULAR; INTRAVENOUS; SOFT TISSUE
Status: COMPLETED | OUTPATIENT
Start: 2025-01-21 | End: 2025-01-21

## 2025-01-21 RX ORDER — NALOXONE HYDROCHLORIDE 0.4 MG/ML
0.4 INJECTION, SOLUTION INTRAMUSCULAR; INTRAVENOUS; SUBCUTANEOUS PRN
Status: DISCONTINUED | OUTPATIENT
Start: 2025-01-21 | End: 2025-01-22 | Stop reason: HOSPADM

## 2025-01-21 RX ORDER — SODIUM CHLORIDE 0.9 % (FLUSH) 0.9 %
5-40 SYRINGE (ML) INJECTION PRN
Status: DISCONTINUED | OUTPATIENT
Start: 2025-01-21 | End: 2025-01-22 | Stop reason: HOSPADM

## 2025-01-21 RX ORDER — MIDAZOLAM HYDROCHLORIDE 2 MG/2ML
4 INJECTION, SOLUTION INTRAMUSCULAR; INTRAVENOUS
Status: COMPLETED | OUTPATIENT
Start: 2025-01-21 | End: 2025-01-21

## 2025-01-21 RX ORDER — KETAMINE HCL IN NACL, ISO-OSM 20 MG/2 ML
SYRINGE (ML) INJECTION
Status: DISCONTINUED | OUTPATIENT
Start: 2025-01-21 | End: 2025-01-21 | Stop reason: SDUPTHER

## 2025-01-21 RX ORDER — OXYCODONE HYDROCHLORIDE 5 MG/1
5 TABLET ORAL EVERY 4 HOURS PRN
Status: DISCONTINUED | OUTPATIENT
Start: 2025-01-21 | End: 2025-01-22 | Stop reason: HOSPADM

## 2025-01-21 RX ORDER — HYDROMORPHONE HYDROCHLORIDE 1 MG/ML
0.5 INJECTION, SOLUTION INTRAMUSCULAR; INTRAVENOUS; SUBCUTANEOUS
Status: DISCONTINUED | OUTPATIENT
Start: 2025-01-21 | End: 2025-01-22 | Stop reason: HOSPADM

## 2025-01-21 RX ORDER — OXYCODONE HYDROCHLORIDE 5 MG/1
5 TABLET ORAL
Status: DISCONTINUED | OUTPATIENT
Start: 2025-01-21 | End: 2025-01-21 | Stop reason: HOSPADM

## 2025-01-21 RX ORDER — LIDOCAINE HYDROCHLORIDE 10 MG/ML
1 INJECTION, SOLUTION INFILTRATION; PERINEURAL
Status: DISCONTINUED | OUTPATIENT
Start: 2025-01-21 | End: 2025-01-21 | Stop reason: HOSPADM

## 2025-01-21 RX ORDER — TRANEXAMIC ACID 100 MG/ML
INJECTION, SOLUTION INTRAVENOUS
Status: DISCONTINUED | OUTPATIENT
Start: 2025-01-21 | End: 2025-01-21 | Stop reason: SDUPTHER

## 2025-01-21 RX ORDER — FENTANYL CITRATE 50 UG/ML
100 INJECTION, SOLUTION INTRAMUSCULAR; INTRAVENOUS
Status: COMPLETED | OUTPATIENT
Start: 2025-01-21 | End: 2025-01-21

## 2025-01-21 RX ORDER — SODIUM CHLORIDE, SODIUM LACTATE, POTASSIUM CHLORIDE, CALCIUM CHLORIDE 600; 310; 30; 20 MG/100ML; MG/100ML; MG/100ML; MG/100ML
INJECTION, SOLUTION INTRAVENOUS
Status: DISCONTINUED | OUTPATIENT
Start: 2025-01-21 | End: 2025-01-21 | Stop reason: SDUPTHER

## 2025-01-21 RX ORDER — OXYCODONE HYDROCHLORIDE 5 MG/1
10 TABLET ORAL EVERY 4 HOURS PRN
Status: DISCONTINUED | OUTPATIENT
Start: 2025-01-21 | End: 2025-01-22 | Stop reason: HOSPADM

## 2025-01-21 RX ORDER — GABAPENTIN 300 MG/1
300 CAPSULE ORAL 2 TIMES DAILY
Status: DISCONTINUED | OUTPATIENT
Start: 2025-01-21 | End: 2025-01-22 | Stop reason: HOSPADM

## 2025-01-21 RX ORDER — HYDROMORPHONE HYDROCHLORIDE 2 MG/ML
INJECTION, SOLUTION INTRAMUSCULAR; INTRAVENOUS; SUBCUTANEOUS
Status: DISCONTINUED | OUTPATIENT
Start: 2025-01-21 | End: 2025-01-21 | Stop reason: SDUPTHER

## 2025-01-21 RX ORDER — ACETAMINOPHEN 325 MG/1
650 TABLET ORAL EVERY 6 HOURS
Status: DISCONTINUED | OUTPATIENT
Start: 2025-01-21 | End: 2025-01-22 | Stop reason: HOSPADM

## 2025-01-21 RX ORDER — ROPIVACAINE HYDROCHLORIDE 2 MG/ML
INJECTION, SOLUTION EPIDURAL; INFILTRATION; PERINEURAL PRN
Status: DISCONTINUED | OUTPATIENT
Start: 2025-01-21 | End: 2025-01-21 | Stop reason: ALTCHOICE

## 2025-01-21 RX ORDER — NALOXONE HYDROCHLORIDE 0.4 MG/ML
INJECTION, SOLUTION INTRAMUSCULAR; INTRAVENOUS; SUBCUTANEOUS PRN
Status: DISCONTINUED | OUTPATIENT
Start: 2025-01-21 | End: 2025-01-21 | Stop reason: HOSPADM

## 2025-01-21 RX ORDER — PROMETHAZINE HYDROCHLORIDE 25 MG/1
25 TABLET ORAL EVERY 6 HOURS PRN
Status: DISCONTINUED | OUTPATIENT
Start: 2025-01-21 | End: 2025-01-22 | Stop reason: HOSPADM

## 2025-01-21 RX ORDER — ACETAMINOPHEN 500 MG
1000 TABLET ORAL ONCE
Status: COMPLETED | OUTPATIENT
Start: 2025-01-21 | End: 2025-01-21

## 2025-01-21 RX ORDER — SENNA AND DOCUSATE SODIUM 50; 8.6 MG/1; MG/1
1 TABLET, FILM COATED ORAL 2 TIMES DAILY
Status: DISCONTINUED | OUTPATIENT
Start: 2025-01-21 | End: 2025-01-22 | Stop reason: HOSPADM

## 2025-01-21 RX ORDER — SODIUM CHLORIDE, SODIUM LACTATE, POTASSIUM CHLORIDE, CALCIUM CHLORIDE 600; 310; 30; 20 MG/100ML; MG/100ML; MG/100ML; MG/100ML
INJECTION, SOLUTION INTRAVENOUS CONTINUOUS
Status: DISCONTINUED | OUTPATIENT
Start: 2025-01-21 | End: 2025-01-21 | Stop reason: HOSPADM

## 2025-01-21 RX ORDER — LIDOCAINE HYDROCHLORIDE 20 MG/ML
INJECTION, SOLUTION EPIDURAL; INFILTRATION; INTRACAUDAL; PERINEURAL
Status: DISCONTINUED | OUTPATIENT
Start: 2025-01-21 | End: 2025-01-21 | Stop reason: SDUPTHER

## 2025-01-21 RX ORDER — METHOCARBAMOL 750 MG/1
750 TABLET, FILM COATED ORAL 4 TIMES DAILY PRN
Status: DISCONTINUED | OUTPATIENT
Start: 2025-01-21 | End: 2025-01-22 | Stop reason: HOSPADM

## 2025-01-21 RX ORDER — DIPHENHYDRAMINE HCL 25 MG
25 CAPSULE ORAL EVERY 6 HOURS PRN
Status: DISCONTINUED | OUTPATIENT
Start: 2025-01-21 | End: 2025-01-22 | Stop reason: HOSPADM

## 2025-01-21 RX ORDER — EPHEDRINE SULFATE 5 MG/ML
INJECTION INTRAVENOUS
Status: DISCONTINUED | OUTPATIENT
Start: 2025-01-21 | End: 2025-01-21 | Stop reason: SDUPTHER

## 2025-01-21 RX ORDER — SODIUM CHLORIDE 9 MG/ML
INJECTION, SOLUTION INTRAVENOUS PRN
Status: DISCONTINUED | OUTPATIENT
Start: 2025-01-21 | End: 2025-01-22 | Stop reason: HOSPADM

## 2025-01-21 RX ORDER — CELECOXIB 200 MG/1
200 CAPSULE ORAL DAILY
Status: DISCONTINUED | OUTPATIENT
Start: 2025-01-22 | End: 2025-01-22 | Stop reason: HOSPADM

## 2025-01-21 RX ORDER — SODIUM CHLORIDE 9 MG/ML
INJECTION, SOLUTION INTRAVENOUS CONTINUOUS
Status: DISCONTINUED | OUTPATIENT
Start: 2025-01-21 | End: 2025-01-22 | Stop reason: HOSPADM

## 2025-01-21 RX ORDER — AMLODIPINE BESYLATE 5 MG/1
5 TABLET ORAL DAILY
Status: DISCONTINUED | OUTPATIENT
Start: 2025-01-22 | End: 2025-01-22 | Stop reason: HOSPADM

## 2025-01-21 RX ORDER — PROPOFOL 10 MG/ML
INJECTION, EMULSION INTRAVENOUS
Status: DISCONTINUED | OUTPATIENT
Start: 2025-01-21 | End: 2025-01-21 | Stop reason: SDUPTHER

## 2025-01-21 RX ORDER — PANTOPRAZOLE SODIUM 40 MG/1
40 TABLET, DELAYED RELEASE ORAL
Status: DISCONTINUED | OUTPATIENT
Start: 2025-01-22 | End: 2025-01-22 | Stop reason: HOSPADM

## 2025-01-21 RX ORDER — FENTANYL CITRATE 50 UG/ML
INJECTION, SOLUTION INTRAMUSCULAR; INTRAVENOUS
Status: DISCONTINUED | OUTPATIENT
Start: 2025-01-21 | End: 2025-01-21 | Stop reason: SDUPTHER

## 2025-01-21 RX ORDER — SODIUM CHLORIDE 9 MG/ML
INJECTION, SOLUTION INTRAVENOUS
Status: DISCONTINUED | OUTPATIENT
Start: 2025-01-21 | End: 2025-01-21 | Stop reason: SDUPTHER

## 2025-01-21 RX ORDER — AMITRIPTYLINE HYDROCHLORIDE 50 MG/1
50 TABLET ORAL NIGHTLY
Status: DISCONTINUED | OUTPATIENT
Start: 2025-01-21 | End: 2025-01-22 | Stop reason: HOSPADM

## 2025-01-21 RX ORDER — SODIUM CHLORIDE 0.9 % (FLUSH) 0.9 %
5-40 SYRINGE (ML) INJECTION EVERY 12 HOURS SCHEDULED
Status: DISCONTINUED | OUTPATIENT
Start: 2025-01-21 | End: 2025-01-22 | Stop reason: HOSPADM

## 2025-01-21 RX ORDER — ONDANSETRON 2 MG/ML
4 INJECTION INTRAMUSCULAR; INTRAVENOUS EVERY 6 HOURS PRN
Status: DISCONTINUED | OUTPATIENT
Start: 2025-01-21 | End: 2025-01-22 | Stop reason: HOSPADM

## 2025-01-21 RX ORDER — MAGNESIUM HYDROXIDE/ALUMINUM HYDROXICE/SIMETHICONE 120; 1200; 1200 MG/30ML; MG/30ML; MG/30ML
15 SUSPENSION ORAL EVERY 6 HOURS PRN
Status: DISCONTINUED | OUTPATIENT
Start: 2025-01-21 | End: 2025-01-22 | Stop reason: HOSPADM

## 2025-01-21 RX ORDER — ONDANSETRON 2 MG/ML
4 INJECTION INTRAMUSCULAR; INTRAVENOUS
Status: DISCONTINUED | OUTPATIENT
Start: 2025-01-21 | End: 2025-01-21 | Stop reason: HOSPADM

## 2025-01-21 RX ORDER — ASPIRIN 81 MG/1
81 TABLET ORAL 2 TIMES DAILY
Status: DISCONTINUED | OUTPATIENT
Start: 2025-01-21 | End: 2025-01-22 | Stop reason: HOSPADM

## 2025-01-21 RX ORDER — HYDROMORPHONE HYDROCHLORIDE 1 MG/ML
1 INJECTION, SOLUTION INTRAMUSCULAR; INTRAVENOUS; SUBCUTANEOUS
Status: DISCONTINUED | OUTPATIENT
Start: 2025-01-21 | End: 2025-01-22 | Stop reason: HOSPADM

## 2025-01-21 RX ORDER — DIPHENHYDRAMINE HYDROCHLORIDE 50 MG/ML
25 INJECTION INTRAMUSCULAR; INTRAVENOUS EVERY 6 HOURS PRN
Status: DISCONTINUED | OUTPATIENT
Start: 2025-01-21 | End: 2025-01-22 | Stop reason: HOSPADM

## 2025-01-21 RX ORDER — DEXAMETHASONE SODIUM PHOSPHATE 10 MG/ML
10 INJECTION INTRAMUSCULAR; INTRAVENOUS EVERY 6 HOURS
Status: DISCONTINUED | OUTPATIENT
Start: 2025-01-22 | End: 2025-01-22 | Stop reason: HOSPADM

## 2025-01-21 RX ADMIN — HYDROMORPHONE HYDROCHLORIDE 0.4 MG: 2 INJECTION INTRAMUSCULAR; INTRAVENOUS; SUBCUTANEOUS at 12:11

## 2025-01-21 RX ADMIN — HYDROMORPHONE HYDROCHLORIDE 0.4 MG: 2 INJECTION INTRAMUSCULAR; INTRAVENOUS; SUBCUTANEOUS at 10:58

## 2025-01-21 RX ADMIN — HYDROMORPHONE HYDROCHLORIDE 0.4 MG: 2 INJECTION INTRAMUSCULAR; INTRAVENOUS; SUBCUTANEOUS at 11:15

## 2025-01-21 RX ADMIN — FENTANYL CITRATE 100 MCG: 50 INJECTION, SOLUTION INTRAMUSCULAR; INTRAVENOUS at 10:14

## 2025-01-21 RX ADMIN — HYDROMORPHONE HYDROCHLORIDE 0.4 MG: 2 INJECTION INTRAMUSCULAR; INTRAVENOUS; SUBCUTANEOUS at 12:10

## 2025-01-21 RX ADMIN — TRANEXAMIC ACID 1000 MG: 100 INJECTION, SOLUTION INTRAVENOUS at 10:30

## 2025-01-21 RX ADMIN — FENTANYL CITRATE 50 MCG: 50 INJECTION INTRAMUSCULAR; INTRAVENOUS at 09:40

## 2025-01-21 RX ADMIN — METHOCARBAMOL 750 MG: 750 TABLET ORAL at 15:07

## 2025-01-21 RX ADMIN — Medication 6 MG: at 21:10

## 2025-01-21 RX ADMIN — ROPIVACAINE HYDROCHLORIDE 20 ML: 2 INJECTION, SOLUTION EPIDURAL; INFILTRATION at 09:40

## 2025-01-21 RX ADMIN — EPHEDRINE SULFATE 10 MG: 5 INJECTION INTRAVENOUS at 11:25

## 2025-01-21 RX ADMIN — HYDROMORPHONE HYDROCHLORIDE 0.4 MG: 2 INJECTION INTRAMUSCULAR; INTRAVENOUS; SUBCUTANEOUS at 10:52

## 2025-01-21 RX ADMIN — WATER 2000 MG: 1 INJECTION INTRAMUSCULAR; INTRAVENOUS; SUBCUTANEOUS at 18:02

## 2025-01-21 RX ADMIN — ACETAMINOPHEN 650 MG: 325 TABLET, FILM COATED ORAL at 18:01

## 2025-01-21 RX ADMIN — SODIUM CHLORIDE, PRESERVATIVE FREE 10 ML: 5 INJECTION INTRAVENOUS at 21:13

## 2025-01-21 RX ADMIN — CEFAZOLIN 2000 MG: 2 INJECTION, POWDER, FOR SOLUTION INTRAMUSCULAR; INTRAVENOUS at 10:20

## 2025-01-21 RX ADMIN — ASPIRIN 81 MG: 81 TABLET, COATED ORAL at 21:10

## 2025-01-21 RX ADMIN — PROMETHAZINE HYDROCHLORIDE 25 MG: 25 TABLET ORAL at 14:00

## 2025-01-21 RX ADMIN — SODIUM CHLORIDE, SODIUM LACTATE, POTASSIUM CHLORIDE, AND CALCIUM CHLORIDE: 600; 310; 30; 20 INJECTION, SOLUTION INTRAVENOUS at 10:04

## 2025-01-21 RX ADMIN — Medication 20 MG: at 10:38

## 2025-01-21 RX ADMIN — LIDOCAINE HYDROCHLORIDE 60 MG: 20 INJECTION, SOLUTION EPIDURAL; INFILTRATION; INTRACAUDAL; PERINEURAL at 10:16

## 2025-01-21 RX ADMIN — EPHEDRINE SULFATE 10 MG: 5 INJECTION INTRAVENOUS at 10:32

## 2025-01-21 RX ADMIN — SODIUM CHLORIDE: 9 INJECTION, SOLUTION INTRAVENOUS at 10:39

## 2025-01-21 RX ADMIN — PROPOFOL 100 MG: 10 INJECTION, EMULSION INTRAVENOUS at 10:16

## 2025-01-21 RX ADMIN — PHENYLEPHRINE HYDROCHLORIDE 200 MCG: 0.1 INJECTION, SOLUTION INTRAVENOUS at 11:41

## 2025-01-21 RX ADMIN — MIDAZOLAM 2 MG: 1 INJECTION INTRAMUSCULAR; INTRAVENOUS at 09:40

## 2025-01-21 RX ADMIN — PHENYLEPHRINE HYDROCHLORIDE 100 MCG: 0.1 INJECTION, SOLUTION INTRAVENOUS at 11:32

## 2025-01-21 RX ADMIN — SENNOSIDES AND DOCUSATE SODIUM 1 TABLET: 50; 8.6 TABLET ORAL at 21:10

## 2025-01-21 RX ADMIN — AMITRIPTYLINE HYDROCHLORIDE 50 MG: 50 TABLET, FILM COATED ORAL at 21:10

## 2025-01-21 RX ADMIN — ACETAMINOPHEN 1000 MG: 500 TABLET, FILM COATED ORAL at 08:36

## 2025-01-21 RX ADMIN — OXYCODONE 10 MG: 5 TABLET ORAL at 18:01

## 2025-01-21 RX ADMIN — DEXAMETHASONE SODIUM PHOSPHATE 4 MG: 4 INJECTION INTRA-ARTICULAR; INTRALESIONAL; INTRAMUSCULAR; INTRAVENOUS; SOFT TISSUE at 09:40

## 2025-01-21 ASSESSMENT — PAIN SCALES - GENERAL
PAINLEVEL_OUTOF10: 2
PAINLEVEL_OUTOF10: 3
PAINLEVEL_OUTOF10: 6

## 2025-01-21 ASSESSMENT — PAIN DESCRIPTION - ORIENTATION: ORIENTATION: RIGHT

## 2025-01-21 ASSESSMENT — PAIN DESCRIPTION - DESCRIPTORS: DESCRIPTORS: ACHING

## 2025-01-21 ASSESSMENT — PAIN DESCRIPTION - PAIN TYPE: TYPE: SURGICAL PAIN

## 2025-01-21 ASSESSMENT — PAIN DESCRIPTION - LOCATION: LOCATION: KNEE

## 2025-01-21 NOTE — ANESTHESIA PROCEDURE NOTES
Airway  Date/Time: 1/21/2025 10:17 AM  Urgency: elective    Airway not difficult    General Information and Staff    Patient location during procedure: OR  Anesthesiologist: Jair Alonzo Jr., MD  Resident/CRNA: Kristofer Ward APRN - CRNA  Performed: resident/CRNA   Performed by: Kristofer Ward APRN - CRNA  Authorized by: Jair Alonzo Jr., MD      Indications and Patient Condition  Indications for airway management: anesthesia  Spontaneous ventilation: present  Sedation level: deep  Preoxygenated: yes  Patient position: sniffing  MILS not maintained throughout  Mask difficulty assessment: not attempted    Final Airway Details  Final airway type: supraglottic airway      Successful airway: oropharyngeal  Size 4     Number of attempts at approach: 1  Ventilation between attempts: supraglottic airway  Number of other approaches attempted: 0    no

## 2025-01-21 NOTE — CARE COORDINATION
Patient is a 84 y.o. year old female admitted for Right TKA . Patient plans to return home on discharge. Order received to arrange home health. Patient without preference towards agency. Referral sent to [facility] by Ran. Patient denies any equipment needs as patient has a walker. Will follow until discharge.      01/21/25 2902   Service Assessment   Patient Orientation Alert and Oriented   Cognition Alert   History Provided By Patient   Primary Caregiver Self   Services At/After Discharge   Transition of Care Consult (CM Consult) Home Health   Internal Home Health Yes   Services At/After Discharge Home Health;PT   Mode of Transport at Discharge Self   Confirm Follow Up Transport Self   Condition of Participation: Discharge Planning   The Plan for Transition of Care is related to the following treatment goals: improve mobility   The Patient and/or Patient Representative was provided with a Choice of Provider? Patient   The Patient and/Or Patient Representative agree with the Discharge Plan? Yes   Freedom of Choice list was provided with basic dialogue that supports the patient's individualized plan of care/goals, treatment preferences, and shares the quality data associated with the providers?  Yes

## 2025-01-21 NOTE — PROGRESS NOTES
Weight Bearing As Tolerated        Restrictions/Precautions: Weight Bearing        LOWER EXTREMITY GROSS EVALUATION:   RIGHT LE   Within Functional Limits   Abnormal   Comments   Strength [] []  Limited from surgery   Range of Motion [] [] AROM RLE (degrees)  R Knee Flexion (0-145): 60  R Knee Extension (0): 5      LEFT LE Within Functional Limits   Abnormal   Comments   Strength [] []  Decreased functional   Range of Motion [] []  Decreased functional     UPPER EXTREMITY GROSS EVALUATION:     Within Functional Limits   Abnormal   Comments   Strength [] []    Range of Motion [] []      SENSATION  Sensation [x]       COGNITION/  PERCEPTION: Intact Impaired (Comments):   Orientation [x]  Slight confusion about initially not realizing she had surgery, answering questions and following commands appropriately   Vision []     Hearing []     Cognition  []       MOBILITY: I Mod I S SBA CGA Min Mod Max Total  NT x2 Comments:   Bed Mobility    Rolling [] [] [] [] [] [] [] [] [] [] []    Supine to Sit [] [] [] [] [] [x] [] [] [] [] []    Scooting [] [] [] [] [] [x] [] [] [] [] []    Sit to Supine [] [] [] [] [] [] [] [] [] [] []    Transfers    Sit to Stand [] [] [] [] [] [x] [] [] [] [] []    Bed to Chair [] [] [] [] [] [x] [] [] [] [] []    Stand to Sit [] [] [] [] [] [x] [] [] [] [] []    Stand Pivot [] [] [] [] [] [] [] [] [] [] []    Toilet [] [] [] [] [] [] [] [] [] [] []     [] [] [] [] [] [] [] [] [] [] []    I=Independent, Mod I=Modified Independent, S=Supervision, SBA=Standby Assistance, CGA=Contact Guard Assistance,   Min=Minimal Assistance, Mod=Moderate Assistance, Max=Maximal Assistance, Total=Total Assistance, NT=Not Tested    BALANCE: Good Fair+ Fair Fair- Poor NT Comments   Sitting Static [x] [] [] [] [] []    Sitting Dynamic [] [x] [] [] [] []              Standing Static [] [x] [] [] [] [] With RW   Standing Dynamic [] [] [x] [] [] [] With RW     PLAN:   FREQUENCY AND DURATION: BID for duration of hospital  stay or until stated goals are met, whichever comes first.    THERAPY PROGNOSIS: Good    PROBLEM LIST:   (Skilled intervention is medically necessary to address:)  Decreased ADL/Functional Activities, Decreased Activity Tolerance, Decreased AROM/PROM, Decreased Gait Ability, Decreased Strength, Decreased Transfer Abilities, and Increased Pain   INTERVENTIONS PLANNED:   (Benefits and precautions of physical therapy have been discussed with the patient.)  Therapeutic Activity, Therapeutic Exercise/HEP, Gait Training, Modalities, and Education       TREATMENT:   EVALUATION: LOW COMPLEXITY: (Untimed Charge)  The initial evaluation charge encompasses clinical chart review, objective assessment, interpretation of assessment, and skilled monitoring of the patient's response to treatment in order to develop a plan of care.     TREATMENT:   Therapeutic Activity (24 Minutes): Therapeutic activity included Supine to Sit, Scooting, Transfer Training, Ambulation on level ground, Sitting balance , Standing balance, and exericses to improve functional Activity tolerance, Balance, Coordination, Mobility, Strength, and ROM.    TREATMENT GRID:  THERAPEUTIC  EXERCISES: DATE:  1/21 DATE:   DATE:      AM PM AM PM AM PM    [] [] [] [] [] []   Ankle Pumps  10       Quad Sets  10       Gluteal Sets  10       Hip Abd/ADduction  10aa       Straight Leg Raises  10aa       Knee Slides  10aa       Short Arc Quads  10aa       Chair Slides                           B = bilateral; AA = active assistive; A = active; P = passive      EDUCATION:  [x] Home Exercises  [x] Fall Precautions  [x] No Pillow Under Knee  [] D/C Instruction Review [x] Cryocuff  [x] Walker Management/Safety  [x] Adaptive Equipment as Needed     Interdisciplinary Patient Education   (Reference education tab)    AFTER TREATMENT PRECAUTIONS: Alarm Activated, Bed/Chair Locked, Call light within reach, Chair, Needs within reach, and RN notified    INTERDISCIPLINARY

## 2025-01-21 NOTE — CONSULTS
[facility] Hospitalist Consult   Admit Date:  2025  7:47 AM   Name:  Nini Apple   Age:  84 y.o.  Sex:  female  :  1940   MRN:  [id number]   Room:  [facility]/01    Presenting/Chief Complaint: No chief complaint on file.    Reason(s) for Admission: Osteoarthritis of right knee [M17.11]  Arthritis of right knee [M17.11]     Hospitalists consulted by Rodrigo Aguilar MD for: HTN, Medical Management    History of Presenting Illness:   Nini Apple is a 84 y.o. female with history of osteoarthritis, neuropathy, CKD stage III, hypertension, chronic pain, history of IBS, anxiety/depression, and history of hypothyroidism who had a right total knee arthroplasty today with Ortho.  During recovery she did have issues with hypertension and we have been consulted for medical management.  She does take Norvasc at baseline.  By the time I saw her her blood pressure had improved and was within normal range.  She stated that she was in a lot of pain while downstairs and that might be why it had increased.  For now would like to change any management but we will add a as needed medication if needed      Assessment & Plan:   Right knee pain with osteoarthritis status post TKA  - Ortho primary  - Is on aspirin 81 mg twice daily per Ortho as well as Ancef for antibiotic coverage      Neuropathy  - Chronic issue and is on amitriptyline and gabapentin for this      Hypertension  - Will continue her Norvasc for now  - Will add as needed medication but will not increase any meds for now  - Will monitor need for additional blood pressure medication with getting Decadron today      History of IBS  - Does take Linzess and we will monitor this        Apparently history of hyperthyroidism per primary care note from last year  - If blood pressure continues to remain elevated or starts to spike we will recheck a TSH tonight but if not we will recheck tomorrow with labs      CKD stage III  - Labs from the seventh shows that she is

## 2025-01-21 NOTE — INTERVAL H&P NOTE
Update History & Physical    The patient's History and Physical of January 20, 2025 was reviewed with the patient and I examined the patient. There was no change. The surgical site was confirmed by the patient and me.     Plan: The risks, benefits, expected outcome, and alternative to the recommended procedure have been discussed with the patient. Patient understands and wants to proceed with the procedure.     Electronically signed by RUFINO BOLAND MD on 1/21/2025 at 9:26 AM

## 2025-01-21 NOTE — PROGRESS NOTES
01/21/25 1550   Oxygen Therapy/Pulse Ox   O2 Therapy Room air   O2 Device None (Room air)   O2 Flow Rate (L/min) 0 L/min   Pulse 96   SpO2 98 %     Patient achieved 1800  MI/sec on IS. Patient encouraged to do 10 breaths every hour while awake-patient agreed and demonstrated. No shortness of breath or distress noted. BS are clear b/l.   [facility] notes reviewed- continuous Sat monitor order noted HS.

## 2025-01-21 NOTE — OP NOTE
[facility]  Velys Robot Assisted Cementled Total Knee Arthroplasty - MS  Patient:Nini Apple   : 1940  Medical Record Number:[id number]  Pre-operative Diagnosis:  Osteoarthritis of right knee [M17.11]  Arthritis of right knee [M17.11]  Post-operative Diagnosis: Osteoarthritis of right knee [M17.11]  Arthritis of right knee [M17.11]    Surgeon: RUFINO BOLAND MD  Assistant: Chacho Garcia PA-C    This surgical assistant was present for the procedure and vital for the performance of the procedure. He assisted with exposure and retraction during the procedure as well as wound closure and dressing application after the procedure.    Anesthesia: General    Procedure: Total Knee Arthroplasty   The complexity of the total joint surgery requires the use of a first assistant for positioning, retraction and assistance in closure. The patient's Body mass index is 21.89 kg/m²., BMI's greater then 35 make surgical exposure and retraction extremely difficult and increase operative time.    Tourniquet Time: none  EBL: 150cc  Additional Findings: Severe Valgus DJD  Releases none    Nini Apple was brought to the operating room and positioned on the operating table.  She was anethestized  IV antibiotics were administered per CMS protocol. Prior to the incision being made a timeout was called identifying the patient, procedure ,operative side and surgeon.The right leg was prepped and draped in the usual sterile manner  An anterior longitudinal incision was accomplished just medial to the tibial tubercle and extending approximal 6 centimeters proximal to the superior pole of the patella.  A medial parapatellar capsular incision was performed. The medial capsular flap was elevated around to the insertion of the semimembranous tendon.  The patella was everted and the knee flexed and externally rotated.  The medial and lateral menisci were excised.  The lateral half of the fat pad excised and the

## 2025-01-21 NOTE — ANESTHESIA PROCEDURE NOTES
Peripheral Block    Patient location during procedure: procedure area  Reason for block: post-op pain management and at surgeon's request  Start time: 1/21/2025 9:40 AM  End time: 1/21/2025 9:42 AM  Staffing  Performed: anesthesiologist   Anesthesiologist: Jair Alonzo Jr., MD  Performed by: Jair Alonzo Jr., MD  Authorized by: Jair Alonzo Jr., MD    Preanesthetic Checklist  Completed: patient identified, IV checked, site marked, risks and benefits discussed, surgical/procedural consents, equipment checked, pre-op evaluation, timeout performed, anesthesia consent given, oxygen available, monitors applied/VS acknowledged, fire risk safety assessment completed and verbalized and blood product R/B/A discussed and consented  Peripheral Block   Patient position: supine  Prep: ChloraPrep  Provider prep: mask and sterile gloves  Patient monitoring: cardiac monitor, continuous pulse ox, frequent blood pressure checks, IV access, oxygen and responsive to questions  Block type: Femoral  Adductor canal  Laterality: right  Injection technique: single-shot  Guidance: ultrasound guided    Needle   Needle type: insulated echogenic nerve stimulator needle   Needle gauge: 20 G  Needle localization: ultrasound guidance  Needle insertion depth: 4 cm  Needle length: 8 cm  Assessment   Injection assessment: negative aspiration for heme, no paresthesia on injection, local visualized surrounding nerve on ultrasound and no intravascular symptoms  Slow fractionated injection: yes  Hemodynamics: stable  Outcomes: uncomplicated and patient tolerated procedure well    Additional Notes  Potential access sites were examined with ultrasound and the acceptable patent access site was selected (site noted above).  The needle path and vein access were visualized in real time using ultrasonography, and an image was recorded for permanent record.     0.2 % Ropivacaine with 4 mg decadron + epi  Medications Administered  ROPivacaine 0.2%

## 2025-01-21 NOTE — PROGRESS NOTES
OCCUPATIONAL THERAPY Initial Assessment, Daily Note, and PM      (Link to Caseload Tracking: OT Visit Days: 1  OT Orders   Time  OT Charge Capture  Rehab Caseload Tracker  Episode     Nini Apple is a 84 y.o. female   PRIMARY DIAGNOSIS: Osteoarthritis of right knee  Osteoarthritis of right knee [M17.11]  Arthritis of right knee [M17.11]  Procedure(s) (LRB):  KNEE TOTAL ARTHROPLASTY ROBOTIC VELYS-RIGHT (Right)  Day of Surgery  Reason for Referral: Pain in Right Knee (M25.561)  Stiffness of Right Knee, Not elsewhere classified (M25.661)  Outpatient in a bed: Payor: [facility] MEDICARE / Plan: [employer] DUAL COMPLETE / Product Type: *No Product type* /     ASSESSMENT:     REHAB RECOMMENDATIONS:   Recommendation to date pending progress:  Setting:  No further skilled occupational therapy after discharge from hospital    Equipment:    Rolling Walker     ASSESSMENT:  Ms. Apple is s/p right TKA and presents with decreased independence with functional mobility and activities of daily living as compared to baseline level of function and safety. Patient would benefit from skilled Occupational Therapy to maximize independence and safety with self-care task and functional mobility.   Patient seen in room and agreeable to therapy. Pt worked on bed mobility, edge of bed sitting tolerance (while edge of bed pt stated she did not realize she had already had her surgery), in room mobility, bathroom mobility, and toileting; all with verbal cues for safety and technique. Pt moves fairly well but is noted with nausea upon being up in room, RN brought medication. Pt was put on a chair alarm due to some very mild confusion. Pt should progress well tomorrow and discharge home with family.  Patient is hopeful to spend the night to better prepare for safe discharge home       [facility] AM-PAC™ “6 Clicks” Daily Activity Inpatient Short Form:           SUBJECTIVE:     Ms. Apple states, \"I can not believe it is  [] [] [x] [] [] [] []    Toileting [] [] [] [] [] [x] [] [] [] []    Upper Body Dressing [] [] [] [x] [] [] [] [] [] []    Lower Body Dressing [] [] [] [] [] [] [x] [] [] []    Feeding [x] [] [] [] [] [] [] [] [] []    Grooming [] [] [] [x] [] [] [] [] [] []    Personal Device Care [] [] [] [] [] [] [] [] [] []    Functional Mobility [] [] [] [] [] [x] [] [] [] [] rw   I=Independent, Mod I=Modified Independent, S=Supervision/Setup, SBA=Standby Assistance, CGA=Contact Guard Assistance, Min=Minimal Assistance, Mod=Moderate Assistance, Max=Maximal Assistance, Total=Total Assistance, NT=Not Tested    PLAN:     FREQUENCY/DURATION   OT Plan of Care:  (one more treatment for full self care training) for duration of hospital stay or until stated goals are met, whichever comes first.    ACUTE OCCUPATIONAL THERAPY GOALS:   (Developed with and agreed upon by patient and/or caregiver.)    GOALS:   DISCHARGE GOALS (in preparation for going home/rehab):  3 days  1.  Ms. Apple will perform lower body dressing activity with minimal assist required to demonstrate improved functional mobility and safety.     2.  Ms. Apple will perform bathing activity with stand by assist required to demonstrate improved functional mobility and safety.  3.  Ms. Apple will perform toileting activity with  stand by assist to demonstrate improved functional mobility and safety.  4.  Ms. Apple will perform all functional transfers transfer with stand by assist to demonstrate improved functional mobility and safety.      PROBLEM LIST:   (Skilled intervention is medically necessary to address:)  Decreased ADL/Functional Activities  Decreased Balance  Increased Pain   INTERVENTIONS PLANNED:   (Benefits and precautions of occupational therapy have been discussed with the patient.)  Self Care Training  Therapeutic Activity  Education         TREATMENT:     EVALUATION: LOW COMPLEXITY: (Untimed Charge)    TREATMENT:   Self Care (25 minutes): Patient

## 2025-01-21 NOTE — ANESTHESIA PRE PROCEDURE
Department of Anesthesiology  Preprocedure Note       Name:  Nini Apple   Age:  84 y.o.  :  1940                                          MRN:  [id number]         Date:  2025      Surgeon: Surgeon(s):  Rodrigo Aguilar MD    Procedure: Procedure(s):  KNEE TOTAL ARTHROPLASTY ROBOTIC VELYS-RIGHT    Medications prior to admission:   Prior to Admission medications    Medication Sig Start Date End Date Taking? Authorizing Provider   aspirin 81 MG EC tablet Take 1 tablet by mouth 2 times daily 25  Yes Rodrigo Aguilar MD   linaclotide (LINZESS) 145 MCG capsule Take 1 capsule by mouth every morning (before breakfast)   Yes Provider, MD Ramila   amitriptyline (ELAVIL) 25 MG tablet Take 2 tablets by mouth nightly   Yes Provider, MD Ramila   melatonin 5 MG TBDP disintegrating tablet Take 1 tablet by mouth nightly   Yes ProviderRamila MD   amLODIPine (NORVASC) 2.5 MG tablet Take 2 tablets by mouth daily 1/10/20  Yes Automatic Reconciliation, Ar   aspirin 81 MG EC tablet Take 1 tablet by mouth daily   Yes Automatic Reconciliation, Ar   cyanocobalamin 500 MCG tablet Take 1 tablet by mouth daily   Yes Automatic Reconciliation, Ar   gabapentin (NEURONTIN) 300 MG capsule Take 1 capsule by mouth 2 times daily. Patient states she only takes PRN 1/10/20  Yes Automatic Reconciliation, Ar   omeprazole (PRILOSEC) 20 MG delayed release capsule Take 2 capsules by mouth daily 3/6/20  Yes Automatic Reconciliation, Ar   oxyCODONE-acetaminophen (PERCOCET) 7.5-325 MG per tablet Take 1 tablet by mouth every 4 hours as needed. 19  Yes Automatic Reconciliation, Ar   oxyCODONE (ROXICODONE) 5 MG immediate release tablet Take 1-2 tablets by mouth every 4 hours as needed for Pain for up to 7 days. Max Daily Amount: 60 mg  Patient not taking: Reported on 2025  Rodrigo Aguilar MD   methocarbamol (ROBAXIN-750) 750 MG tablet Take 1 tablet by mouth every 6 hours as needed for

## 2025-01-21 NOTE — PERIOP NOTE
TRANSFER - OUT REPORT:    Verbal report given to ALLA Harper on Nini Apple  being transferred to [facility] for routine post-op       Report consisted of patient's Situation, Background, Assessment and   Recommendations(SBAR).     Information from the following report(s) Nurse Handoff Report and Cardiac Rhythm SR  was reviewed with the receiving nurse.           Lines:   Peripheral IV 01/21/25 Left;Posterior Hand (Active)   Site Assessment Clean, dry & intact 01/21/25 1220   Line Status Infusing 01/21/25 1220   Line Care Connections checked and tightened 01/21/25 1220   Phlebitis Assessment No symptoms 01/21/25 1220   Infiltration Assessment 0 01/21/25 1220   Alcohol Cap Used No 01/21/25 1220   Dressing Status Clean, dry & intact 01/21/25 1220   Dressing Type Transparent 01/21/25 1220        Opportunity for questions and clarification was provided.      Patient transported with:  O2 @ room air lpm

## 2025-01-21 NOTE — ANESTHESIA POSTPROCEDURE EVALUATION
Department of Anesthesiology  Postprocedure Note    Patient: Nini Apple  MRN: [id number]  YOB: 1940  Date of evaluation: 1/21/2025    Procedure Summary       Date: 01/21/25 Room / Location[place] [facility] 01 / [facility] [place]    Anesthesia Start: 1004 Anesthesia Stop: 1222    Procedure: KNEE TOTAL ARTHROPLASTY ROBOTIC VELYS-RIGHT (Right: Knee) Diagnosis:       Osteoarthritis of right knee      (Osteoarthritis of right knee [M17.11])    Surgeons: Rodrigo Aguilar MD Responsible Provider: Jair Alonzo Jr., MD    Anesthesia Type: spinal ASA Status: 3            Anesthesia Type: No value filed.    Farhana Phase I: Farhana Score: 8    Farhana Phase II:      Anesthesia Post Evaluation    Patient location during evaluation: PACU  Patient participation: complete - patient participated  Level of consciousness: awake  Pain score: 0  Airway patency: patent  Nausea & Vomiting: no nausea and no vomiting  Cardiovascular status: blood pressure returned to baseline and hemodynamically stable  Respiratory status: acceptable, spontaneous ventilation and nonlabored ventilation  Hydration status: euvolemic  Multimodal analgesia pain management approach  Pain management: adequate    No notable events documented.

## 2025-01-21 NOTE — INTERVAL H&P NOTE
Update History & Physical    The patient's History and Physical of January 20, 25 was reviewed with the patient and I examined the patient. There was no change. The surgical site was confirmed by the patient and me.     Plan: The risks, benefits, expected outcome, and alternative to the recommended procedure have been discussed with the patient. Patient understands and wants to proceed with the procedure.     Electronically signed by JUNO Nance on 1/21/2025 at 8:44 AM

## 2025-01-22 VITALS
DIASTOLIC BLOOD PRESSURE: 73 MMHG | OXYGEN SATURATION: 96 % | WEIGHT: 148.2 LBS | SYSTOLIC BLOOD PRESSURE: 139 MMHG | RESPIRATION RATE: 18 BRPM | HEART RATE: 86 BPM | HEIGHT: 69 IN | BODY MASS INDEX: 21.95 KG/M2 | TEMPERATURE: 97.9 F

## 2025-01-22 LAB
ANION GAP SERPL CALC-SCNC: 8 MMOL/L (ref 7–16)
BASOPHILS # BLD: 0.01 K/UL (ref 0–0.2)
BASOPHILS NFR BLD: 0.1 % (ref 0–2)
BUN SERPL-MCNC: 16 MG/DL (ref 8–23)
CALCIUM SERPL-MCNC: 9.3 MG/DL (ref 8.8–10.2)
CHLORIDE SERPL-SCNC: 103 MMOL/L (ref 98–107)
CO2 SERPL-SCNC: 24 MMOL/L (ref 20–29)
CREAT SERPL-MCNC: 1.08 MG/DL (ref 0.6–1.1)
DIFFERENTIAL METHOD BLD: ABNORMAL
EOSINOPHIL # BLD: 0.03 K/UL (ref 0–0.8)
EOSINOPHIL NFR BLD: 0.2 % (ref 0.5–7.8)
ERYTHROCYTE [DISTWIDTH] IN BLOOD BY AUTOMATED COUNT: 12.6 % (ref 11.9–14.6)
GLUCOSE SERPL-MCNC: 133 MG/DL (ref 70–99)
HCT VFR BLD AUTO: 33.7 % (ref 35.8–46.3)
HGB BLD-MCNC: 11.5 G/DL (ref 11.7–15.4)
IMM GRANULOCYTES # BLD AUTO: 0.04 K/UL (ref 0–0.5)
IMM GRANULOCYTES NFR BLD AUTO: 0.3 % (ref 0–5)
LYMPHOCYTES # BLD: 1.33 K/UL (ref 0.5–4.6)
LYMPHOCYTES NFR BLD: 10.4 % (ref 13–44)
MCH RBC QN AUTO: 31.4 PG (ref 26.1–32.9)
MCHC RBC AUTO-ENTMCNC: 34.1 G/DL (ref 31.4–35)
MCV RBC AUTO: 92.1 FL (ref 82–102)
MONOCYTES # BLD: 1.08 K/UL (ref 0.1–1.3)
MONOCYTES NFR BLD: 8.4 % (ref 4–12)
NEUTS SEG # BLD: 10.34 K/UL (ref 1.7–8.2)
NEUTS SEG NFR BLD: 80.6 % (ref 43–78)
NRBC # BLD: 0 K/UL (ref 0–0.2)
PLATELET # BLD AUTO: 171 K/UL (ref 150–450)
PMV BLD AUTO: 9.2 FL (ref 9.4–12.3)
POTASSIUM SERPL-SCNC: 4.5 MMOL/L (ref 3.5–5.1)
RBC # BLD AUTO: 3.66 M/UL (ref 4.05–5.2)
SODIUM SERPL-SCNC: 135 MMOL/L (ref 136–145)
TSH W FREE THYROID IF ABNORMAL: 0.36 UIU/ML (ref 0.27–4.2)
WBC # BLD AUTO: 12.8 K/UL (ref 4.3–11.1)

## 2025-01-22 PROCEDURE — 36415 COLL VENOUS BLD VENIPUNCTURE: CPT

## 2025-01-22 PROCEDURE — 2500000003 HC RX 250 WO HCPCS: Performed by: PHYSICIAN ASSISTANT

## 2025-01-22 PROCEDURE — 85025 COMPLETE CBC W/AUTO DIFF WBC: CPT

## 2025-01-22 PROCEDURE — 80048 BASIC METABOLIC PNL TOTAL CA: CPT

## 2025-01-22 PROCEDURE — 97530 THERAPEUTIC ACTIVITIES: CPT

## 2025-01-22 PROCEDURE — 97535 SELF CARE MNGMENT TRAINING: CPT

## 2025-01-22 PROCEDURE — 84443 ASSAY THYROID STIM HORMONE: CPT

## 2025-01-22 PROCEDURE — 6370000000 HC RX 637 (ALT 250 FOR IP): Performed by: PHYSICIAN ASSISTANT

## 2025-01-22 PROCEDURE — 6360000002 HC RX W HCPCS: Performed by: PHYSICIAN ASSISTANT

## 2025-01-22 RX ADMIN — WATER 2000 MG: 1 INJECTION INTRAMUSCULAR; INTRAVENOUS; SUBCUTANEOUS at 01:28

## 2025-01-22 RX ADMIN — ACETAMINOPHEN 650 MG: 325 TABLET, FILM COATED ORAL at 01:28

## 2025-01-22 RX ADMIN — ACETAMINOPHEN 650 MG: 325 TABLET, FILM COATED ORAL at 05:38

## 2025-01-22 RX ADMIN — ASPIRIN 81 MG: 81 TABLET, COATED ORAL at 08:45

## 2025-01-22 RX ADMIN — PANTOPRAZOLE SODIUM 40 MG: 40 TABLET, DELAYED RELEASE ORAL at 05:38

## 2025-01-22 RX ADMIN — OXYCODONE 10 MG: 5 TABLET ORAL at 01:28

## 2025-01-22 RX ADMIN — AMLODIPINE BESYLATE 5 MG: 5 TABLET ORAL at 08:45

## 2025-01-22 RX ADMIN — OXYCODONE 10 MG: 5 TABLET ORAL at 05:38

## 2025-01-22 RX ADMIN — CELECOXIB 200 MG: 200 CAPSULE ORAL at 08:45

## 2025-01-22 RX ADMIN — GABAPENTIN 300 MG: 300 CAPSULE ORAL at 08:45

## 2025-01-22 RX ADMIN — SENNOSIDES AND DOCUSATE SODIUM 1 TABLET: 50; 8.6 TABLET ORAL at 08:45

## 2025-01-22 RX ADMIN — OXYCODONE 10 MG: 5 TABLET ORAL at 09:48

## 2025-01-22 ASSESSMENT — PAIN DESCRIPTION - LOCATION
LOCATION: KNEE

## 2025-01-22 ASSESSMENT — PAIN SCALES - GENERAL
PAINLEVEL_OUTOF10: 2
PAINLEVEL_OUTOF10: 6
PAINLEVEL_OUTOF10: 2
PAINLEVEL_OUTOF10: 4
PAINLEVEL_OUTOF10: 6
PAINLEVEL_OUTOF10: 6

## 2025-01-22 ASSESSMENT — PAIN DESCRIPTION - DESCRIPTORS
DESCRIPTORS: THROBBING;PRESSURE;SHARP
DESCRIPTORS: ACHING;SHARP;PRESSURE
DESCRIPTORS: ACHING

## 2025-01-22 ASSESSMENT — PAIN - FUNCTIONAL ASSESSMENT
PAIN_FUNCTIONAL_ASSESSMENT: PREVENTS OR INTERFERES SOME ACTIVE ACTIVITIES AND ADLS
PAIN_FUNCTIONAL_ASSESSMENT: PREVENTS OR INTERFERES SOME ACTIVE ACTIVITIES AND ADLS

## 2025-01-22 ASSESSMENT — PAIN DESCRIPTION - ORIENTATION
ORIENTATION: RIGHT

## 2025-01-22 ASSESSMENT — PAIN DESCRIPTION - PAIN TYPE: TYPE: SURGICAL PAIN

## 2025-01-22 NOTE — FLOWSHEET NOTE
01/22/25 1316   AVS Reviewed   AVS & discharge instructions reviewed with patient and/or representative? Yes   Reviewed instructions with Patient   Level of Understanding Questions answered;Teach back completed;Verbalized understanding

## 2025-01-22 NOTE — PROGRESS NOTES
Hospitalist Progress Note   Admit Date:  2025  7:47 AM   Name:  Nini Apple   Age:  84 y.o.  Sex:  female  :  1940   MRN:  [id number]   Room:  [facility]/    Presenting/Chief Complaint: No chief complaint on file.     Reason(s) for Admission: Osteoarthritis of right knee [M17.11]  Arthritis of right knee [M17.11]     Hospital Course:   Nini Apple is a 84 y.o. female with medical history of osteoarthritis, neuropathy, CKD stage III, hypertension, chronic pain, history of IBS, anxiety/depression, and history of hypothyroidism who had a right total knee arthroplasty today with Ortho.  During recovery she did have issues with hypertension and we have been consulted for medical management.  She does take Norvasc at baseline.  By the time I saw her her blood pressure had improved and was within normal range.  She stated that she was in a lot of pain while downstairs and that might be why it had increased.  For now would like to change any management but we will add a as needed medication if needed       Subjective & 24hr Events:   Mrs. Apple is seen today in her room. She remains stable. No change in BP management. Labs are stable. Medically stable for discharge.      Assessment & Plan:   Right knee pain with osteoarthritis status post TKA  - Ortho primary  - Is on aspirin 81 mg twice daily per Ortho as well as Ancef for antibiotic coverage        Neuropathy  - Chronic issue and is on amitriptyline and gabapentin for this        Hypertension  - Will continue her Norvasc for now  - Will add as needed medication but will not increase any meds for now  - Will monitor need for additional blood pressure medication with getting Decadron today        History of IBS  - Does take Linzess and we will monitor this        Apparently history of hyperthyroidism per primary care note from last year  - TSH normal        CKD stage III  - Labs from the seventh shows that she is at her baseline but will recheck in the  morning      Anticipated Discharge Arrangements:   Home Health    PT/OT evals ordered?  Therapy evals ordered  Diet:  ADULT DIET; Regular  VTE prophylaxis:  Asa 81mg BID  Code status: Full Code      Non-peripheral Lines and Tubes (if present):          Telemetry (if present):  Cardiac/Telemetry Monitor On: Bedside monitor in use        Hospital Problems:  Principal Problem:    Osteoarthritis of right knee  Active Problems:    Arthritis of right knee  Resolved Problems:    * No resolved hospital problems. *      Objective:   Patient Vitals for the past 24 hrs:   Temp Pulse Resp BP SpO2   01/22/25 1018 -- -- 18 -- --   01/22/25 0738 97.9 °F (36.6 °C) 86 18 139/73 96 %   01/22/25 0608 -- -- 18 -- --   01/22/25 0205 98.4 °F (36.9 °C) (!) 103 18 (!) 142/77 97 %   01/22/25 0158 -- -- 18 -- --   01/21/25 2255 -- (!) 111 16 -- 97 %   01/21/25 2201 98.8 °F (37.1 °C) 92 16 (!) 144/87 98 %   01/21/25 1831 -- -- 16 -- --   01/21/25 1802 97.7 °F (36.5 °C) (!) 107 13 (!) 150/74 98 %   01/21/25 1550 -- 96 -- -- 98 %       Oxygen Therapy  SpO2: 96 %  Pulse via Oximetry: 96 beats per minute  Pulse Oximeter Device Mode: Continuous  Pulse Oximeter Device Location: Finger, Left  O2 Device: None (Room air)  O2 Flow Rate (L/min): 0 L/min    Estimated body mass index is 21.89 kg/m² as calculated from the following:    Height as of this encounter: 1.753 m (5' 9\").    Weight as of this encounter: 67.2 kg (148 lb 3.2 oz).  No intake or output data in the 24 hours ending 01/22/25 1440      Physical Exam:   General:          Well nourished.  Awake alert and very pleasant complaining of some mild knee pain  Head:               Normocephalic, atraumatic  Eyes:               Sclerae appear normal.  Pupils equally round.  ENT:                Nares appear normal.  Moist oral mucosa  Neck:               No restricted ROM.  Trachea midline   CV:                  RRR.  No m/r/g.  No jugular venous distension.  Lungs:             CTAB.  No wheezing,

## 2025-01-22 NOTE — PROGRESS NOTES
OCCUPATIONAL THERAPY Daily Note, Discharge, and AM      (Link to Caseload Tracking: OT Visit Days: 2  OT Orders   Time  OT Charge Capture  Rehab Caseload Tracker  Episode     Nini Apple is a 84 y.o. female   PRIMARY DIAGNOSIS: Osteoarthritis of right knee  Osteoarthritis of right knee [M17.11]  Arthritis of right knee [M17.11]  Procedure(s) (LRB):  KNEE TOTAL ARTHROPLASTY ROBOTIC VELYS-RIGHT (Right)  1 Day Post-Op  Reason for Referral: Pain in Right Knee (M25.561)  Stiffness of Right Knee, Not elsewhere classified (M25.661)  Outpatient in a bed: Payor: [facility] MEDICARE / Plan: [employer] DUAL COMPLETE / Product Type: *No Product type* /     ASSESSMENT:     REHAB RECOMMENDATIONS:   Recommendation to date pending progress:  Setting:  No further skilled occupational therapy after discharge from hospital    Equipment:    Rolling Walker     ASSESSMENT:  Ms. Apple is s/p right TKA and presents with decreased independence with functional mobility and activities of daily living as compared to baseline level of function and safety. Patient would benefit from skilled Occupational Therapy to maximize independence and safety with self-care task and functional mobility.   Patient seen in room and agreeable to therapy. Pt worked on bed mobility, edge of bed sitting tolerance (while edge of bed pt stated she did not realize she had already had her surgery), in room mobility, bathroom mobility, and toileting; all with verbal cues for safety and technique. Pt moves fairly well but is noted with nausea upon being up in room, RN brought medication. Pt was put on a chair alarm due to some very mild confusion. Pt should progress well tomorrow and discharge home with family.  Patient is hopeful to spend the night to better prepare for safe discharge home      1/22/2025   Ms. Apple is s/p right TKA. Patient completed self care as charted below in ADL grid and is ambulating with rolling walker and stand by assist; all with verbal

## 2025-01-22 NOTE — PROGRESS NOTES
ACUTE PHYSICAL THERAPY GOALS:   (Developed with and agreed upon by patient and/or caregiver.)  GOALS (1-4 days):  (1.)Ms. Apple will move from supine to sit and sit to supine  in bed with STAND BY ASSIST.  (2.)Ms. Apple will transfer from bed to chair and chair to bed with STAND BY ASSIST using the least restrictive device.met  (3.)Ms. Apple will ambulate with STAND BY ASSIST for 100 feet with the least restrictive device.  (4.)Ms. Apple will ambulate up/down 5 steps with left railing with MINIMAL ASSIST.  (5.)Ms. Apple will increase right knee ROM to 3-90°.  ________________________________________________________________________________________________            PHYSICAL THERAPY: TOTAL KNEE ARTHROPLASTY Daily Note and AM  (Link to Caseload Tracking: PT Visit Days : 2  Acknowledge Orders  Time In/Out  PT Charge Capture  Rehab Caseload Tracker  Episode   Nini Apple is a 84 y.o. female   PRIMARY DIAGNOSIS: Osteoarthritis of right knee  Osteoarthritis of right knee [M17.11]  Arthritis of right knee [M17.11]  Procedure(s) (LRB):  KNEE TOTAL ARTHROPLASTY ROBOTIC VELYS-RIGHT (Right)  1 Day Post-Op  Reason for Referral: Pain in Right Knee (M25.561)  Stiffness of Right Knee, Not elsewhere classified (M25.661)  Difficulty in walking, Not elsewhere classified (R26.2)  Outpatient in a bed: Payor: [facility] MEDICARE / Plan: [employer] DUAL COMPLETE / Product Type: *No Product type* /     REHAB RECOMMENDATIONS:   Recommendation to date pending progress:  Setting:  Home Health Therapy    Equipment:    Rolling Walker     RANGE OF MOTION:   Right Knee Flexion: R Knee Flexion (0-145): 62  Right Knee Extension: R Knee Extension (0): 12     GAIT: I Mod I S SBA CGA Min Mod Max Total  NT x2 Comments:   Level of Assistance [] [] [] [x] [x] [] [] [] [] [] []            Weightbearing Status  Right Lower Extremity Weight Bearing: Weight Bearing As Tolerated    Distance  75 feet    Gait Quality Decreased javier , Decreased step  SBA=Standby Assistance, CGA=Contact Guard Assistance,   Min=Minimal Assistance, Mod=Moderate Assistance, Max=Maximal Assistance, Total=Total Assistance, NT=Not Tested    BALANCE: Good Fair+ Fair Fair- Poor NT Comments   Sitting Static [x] [] [] [] [] []    Sitting Dynamic [] [x] [] [] [] []              Standing Static [x] [x] [] [] [] [] With RW   Standing Dynamic [] [x] [x] [] [] [] With RW     PLAN:   FREQUENCY AND DURATION: BID for duration of hospital stay or until stated goals are met, whichever comes first.    THERAPY PROGNOSIS: Good    PROBLEM LIST:   (Skilled intervention is medically necessary to address:)  Decreased ADL/Functional Activities, Decreased Activity Tolerance, Decreased AROM/PROM, Decreased Gait Ability, Decreased Strength, Decreased Transfer Abilities, and Increased Pain   INTERVENTIONS PLANNED:   (Benefits and precautions of physical therapy have been discussed with the patient.)  Therapeutic Activity, Therapeutic Exercise/HEP, Gait Training, Modalities, and Education       TREATMENT:       TREATMENT:   Therapeutic Activity (30 Minutes): Therapeutic activity included Transfer Training, Ambulation on level ground, Sitting balance , Standing balance, and exericses to improve functional Activity tolerance, Balance, Coordination, Mobility, Strength, and ROM.    TREATMENT GRID:  THERAPEUTIC  EXERCISES: DATE:  1/21 DATE:  1/22 DATE:      AM PM AM PM AM PM    [] [] [] [] [] []   Ankle Pumps  10 10      Quad Sets  10 10      Gluteal Sets  10 10      Hip Abd/ADduction  10aa 10      Straight Leg Raises  10aa 10aa      Knee Slides  10aa 10aa      Short Arc Quads  10aa 10aa      Chair Slides   10                        B = bilateral; AA = active assistive; A = active; P = passive      EDUCATION:  [x] Home Exercises  [x] Fall Precautions  [x] No Pillow Under Knee  [x] D/C Instruction Review [x] Cryocuff  [x] Walker Management/Safety  [x] Adaptive Equipment as Needed     Interdisciplinary Patient

## 2025-01-22 NOTE — PROGRESS NOTES
01/21/25 2344   Treatment   Treatment Type IS   Oxygen Therapy/Pulse Ox   O2 Device None (Room air)   Pulse (!) 111   Respirations 16   SpO2 97 %   Pulse Oximeter Device Mode Continuous   Pulse Oximeter Device Location Finger;Left   Pulse Oximeter Low SpO2 Alarm 89   Pulse Oximeter High SpO2 Alarm 100   $Pulse Oximeter $Spot check (multiple/continuous)   Incentive Spirometry Tx   Treatment Effort Assisted by RT;Well   Achieved Volume (mL) 1750 mL     Patient placed on continuous sat monitor with alarms set per protocol (%). Monitor history deleted prior to placing on patient. Patient working on IS achieving 1800 ml. Very good effort, technique.

## 2025-01-22 NOTE — PROGRESS NOTES
Name: Nini Apple  YOB: 1940  Gender: female  MRN: [id number]           January 22, 2025         Post Op day: 1 Day Post-Op     Admit Date: 1/21/2025  Admit Diagnosis: Osteoarthritis of right knee [M17.11]  Arthritis of right knee [M17.11]  Procedure: Procedure(s) (LRB):  KNEE TOTAL ARTHROPLASTY ROBOTIC VELYS-RIGHT (Right)     LAB:    Recent Results (from the past 24 hour(s))   Basic Metabolic Panel    Collection Time: 01/22/25  3:15 AM   Result Value Ref Range    Sodium 135 (L) 136 - 145 mmol/L    Potassium 4.5 3.5 - 5.1 mmol/L    Chloride 103 98 - 107 mmol/L    CO2 24 20 - 29 mmol/L    Anion Gap 8 7 - 16 mmol/L    Glucose 133 (H) 70 - 99 mg/dL    BUN 16 8 - 23 MG/DL    Creatinine 1.08 0.60 - 1.10 MG/DL    Est, Glom Filt Rate 50 (L) >60 ml/min/1.73m2    Calcium 9.3 8.8 - 10.2 MG/DL   CBC with Auto Differential    Collection Time: 01/22/25  3:15 AM   Result Value Ref Range    WBC 12.8 (H) 4.3 - 11.1 K/uL    RBC 3.66 (L) 4.05 - 5.2 M/uL    Hemoglobin 11.5 (L) 11.7 - 15.4 g/dL    Hematocrit 33.7 (L) 35.8 - 46.3 %    MCV 92.1 82.0 - 102.0 FL    MCH 31.4 26.1 - 32.9 PG    MCHC 34.1 31.4 - 35.0 g/dL    RDW 12.6 11.9 - 14.6 %    Platelets 171 150 - 450 K/uL    MPV 9.2 (L) 9.4 - 12.3 FL    nRBC 0.00 0.0 - 0.2 K/uL    Differential Type AUTOMATED      Neutrophils % 80.6 (H) 43.0 - 78.0 %    Lymphocytes % 10.4 (L) 13.0 - 44.0 %    Monocytes % 8.4 4.0 - 12.0 %    Eosinophils % 0.2 (L) 0.5 - 7.8 %    Basophils % 0.1 0.0 - 2.0 %    Immature Granulocytes % 0.3 0.0 - 5.0 %    Neutrophils Absolute 10.34 (H) 1.70 - 8.20 K/UL    Lymphocytes Absolute 1.33 0.50 - 4.60 K/UL    Monocytes Absolute 1.08 0.10 - 1.30 K/UL    Eosinophils Absolute 0.03 0.00 - 0.80 K/UL    Basophils Absolute 0.01 0.00 - 0.20 K/UL    Immature Granulocytes Absolute 0.04 0.0 - 0.5 K/UL   TSH reflex to FT4    Collection Time: 01/22/25  3:15 AM   Result Value Ref Range    TSH w Free Thyroid if Abnormal 0.36 0.27 - 4.20 UIU/ML     Vital Signs:   Osteoarthritis of right knee    Arthritis of right knee             Plan: Continue PT/OT. Home soon.       Signed By: RUFINO BOLAND MD

## 2025-01-22 NOTE — DISCHARGE INSTRUCTIONS
[facility]      Patient Discharge Instructions    Nini Apple/[id number] : 1940    Admitted 2025 Discharged: 2025       IF YOU HAVE ANY PROBLEMS ONCE YOU ARE AT HOME CALL THE FOLLOWING NUMBERS:   Main office number: [phone]      Medications    The medications you are to continue on are listed on the medication reconciliation sheet.   Narcotic pain medications as well as supplemental iron can cause constipation. If this occurs try stopping the narcotic pain medication and/or the iron.   It is important that you take the medication exactly as they are prescribed.  Medications which increase your risk of blood clots are listed to stop for 5 weeks after surgery as well as medications or supplements which increase your risk of bleeding complications.   Keep your medication in the bottles provided by the pharmacist and keep a list of the medication names, dosages, and times to be taken in your wallet.   Do not take other medications without consulting your doctor.       Important Information    Do NOT smoke as this will greatly increase your risk of infection!    Resume your prehospital diet. If you have excessive nausea or vomitting call your doctor's office     Leg swelling and warmth is normal for 6 months after surgery. If you experience swelling in your leg elevate you leg while laying down with your toes above your heart. If you have sudden onset severe swelling with leg pain call our office. Use Beka Hose stockings until we see you in the office for your follow up appointment.    The stitches deep inside take approximately 6 months to dissolve. There will be sharp shooting, stinging and burning pain. This is normal and will resolve between 3-6 months after surgery.     Difficulty sleeping is normal following total Knee and Hip replacement. You may try melatonin, an over-the-counter sleep aid or benadryl to help with sleep. Most patients will resume sleeping through the night 8

## 2025-02-03 ENCOUNTER — TELEPHONE (OUTPATIENT)
Dept: ORTHOPEDIC SURGERY | Age: 85
End: 2025-02-03

## 2025-02-03 DIAGNOSIS — M17.11 PRIMARY OSTEOARTHRITIS OF RIGHT KNEE: Primary | ICD-10-CM

## 2025-02-03 RX ORDER — OXYCODONE HYDROCHLORIDE 5 MG/1
5-10 TABLET ORAL EVERY 4 HOURS PRN
Qty: 60 TABLET | Refills: 0 | Status: SHIPPED | OUTPATIENT
Start: 2025-02-03 | End: 2025-02-10

## 2025-02-03 NOTE — TELEPHONE ENCOUNTER
Please  fax  out  patient therapy  order to     Fax  [phone]   Formerly [facility]

## 2025-02-10 ENCOUNTER — TELEPHONE (OUTPATIENT)
Dept: ORTHOPEDIC SURGERY | Age: 85
End: 2025-02-10

## 2025-02-10 NOTE — TELEPHONE ENCOUNTER
She needs to speak to someone about her pain meds. She says someone has her all messed up. She had surgery Jan 21. She has always taken Oxy 10/325 from  pain mgmt and 5mg was called in which doesn't even touch her pain. She is asking for a return call.

## 2025-02-11 ENCOUNTER — TELEPHONE (OUTPATIENT)
Dept: ORTHOPEDIC SURGERY | Age: 85
End: 2025-02-11

## 2025-02-11 NOTE — TELEPHONE ENCOUNTER
Dana from [facility] is returning [facility]'s call.  The return number is [phone]

## 2025-02-24 ENCOUNTER — OFFICE VISIT (OUTPATIENT)
Dept: ORTHOPEDIC SURGERY | Age: 85
End: 2025-02-24

## 2025-02-24 DIAGNOSIS — Z96.651 S/P TOTAL KNEE ARTHROPLASTY, RIGHT: Primary | ICD-10-CM

## 2025-02-24 DIAGNOSIS — Z09 FOLLOW-UP EXAMINATION: ICD-10-CM

## 2025-02-24 PROCEDURE — 99024 POSTOP FOLLOW-UP VISIT: CPT | Performed by: ORTHOPAEDIC SURGERY

## 2025-02-24 NOTE — PROGRESS NOTES
Name: Nini Apple  YOB: 1940  Gender: female  MRN: [id number]    RIGHT Post-Op TKA 4 week follow up    This patient returns now four week status post s/p TKA. Things have gone reasonably well with therapy and the patient is now weaning from the cane. The pain level has improved. There has been no significant problem since the patient was last seen. On exam, the incision is healing approriately. ROM is 0 to 100. The patient has minimal antalgia in stance and good alignment in stance.    Radiographs are obtained. Standing AP, flexion lateral and sunrise views of the RIGHT knee demonstrates well positioned TKA with good bone implant interfaces. No significant abnormalities are seen.          RADIOGRAPHIC IMPRESSION: Stable RIGHT TKA.           CLINICAL IMPRESSION AND PLAN: Now four weeks s/p TKA .  The patient is doing reasonably well. I have made recommendations about activity. We will ask the patient to continue to wean from the cane. Recommendations for further therapy include OUTPATIENT THERAPY FOR 3-4 WEEKS. The patient will follow back up in in 4-5 months.      Work/Activity Restrictions: not applicable    JUNO Nance

## 2025-02-26 ENCOUNTER — HOSPITAL ENCOUNTER (OUTPATIENT)
Dept: PHYSICAL THERAPY | Age: 85
Setting detail: RECURRING SERIES
Discharge: HOME | End: 2025-02-28
Attending: ORTHOPAEDIC SURGERY
Payer: MEDICARE

## 2025-02-26 DIAGNOSIS — R26.2 DIFFICULTY IN WALKING, NOT ELSEWHERE CLASSIFIED: ICD-10-CM

## 2025-02-26 DIAGNOSIS — M25.561 ACUTE PAIN OF RIGHT KNEE: Primary | ICD-10-CM

## 2025-02-26 DIAGNOSIS — Z47.89 ENCOUNTER FOR OTHER ORTHOPEDIC AFTERCARE: ICD-10-CM

## 2025-02-26 DIAGNOSIS — M25.661 STIFFNESS OF RIGHT KNEE, NOT ELSEWHERE CLASSIFIED: ICD-10-CM

## 2025-02-26 PROCEDURE — 97162 PT EVAL MOD COMPLEX 30 MIN: CPT

## 2025-02-26 PROCEDURE — 97110 THERAPEUTIC EXERCISES: CPT

## 2025-02-26 ASSESSMENT — PAIN SCALES - GENERAL: PAINLEVEL_OUTOF10: 10

## 2025-02-26 NOTE — THERAPY EVALUATION
Nini Apple  : 1940  Primary: [facility] Dual Complete (Medicare Managed)  Secondary: MEDICAID [place] [facility] [place] [address] [facility] [address]  Phone: [phone]  Fax: [phone] Plan Frequency: 2x per week for up to 90 days    Plan of Care/Certification Expiration Date: 25        Plan of Care/Certification Expiration Date:  Plan of Care/Certification Expiration Date: 25    Frequency/Duration: Plan Frequency: 2x per week for up to 90 days      Time In/Out:   Time In: 1325  Time Out: 1402      PT Visit Info:    Progress Note Counter: 1      Visit Count:  1                OUTPATIENT PHYSICAL THERAPY:             Initial Assessment 2025               Episode (s/p R TKA)         Treatment Diagnosis:     Acute pain of right knee  Stiffness of right knee, not elsewhere classified  Difficulty in walking, not elsewhere classified  Encounter for other orthopedic aftercare  Medical/Referring Diagnosis:    S/P total knee arthroplasty, right [Z96.651]    Referring Physician:  Rodrigo Aguilar MD MD Orders:  PT Eval and Treat   Return MD Appt:  TBD by pt  Date of Onset:    DOS 25  Allergies:  Patient has no known allergies.  Restrictions/Precautions:    None      Medications Last Reviewed: 2025     SUBJECTIVE   History of Injury/Illness (Reason for Referral):  Pt is s/p R TKA. Pt had HHPT following surgery. Pt currently ambulating with SPC. Reports having a lot of pain and difficulty walking.  Patient Stated Goal(s):  \"Get this knee straightened out\"  Initial Pain Level:      10/10   Post Session Pain Level:     10/10  Past Medical History/Comorbidities:   Ms. Apple  has a past medical history of Arthritis, Burning feet syndrome, Chronic pain, CKD (chronic kidney disease), Diverticulitis, DJD (degenerative joint disease), Gastrointestinal disorder, GERD (gastroesophageal reflux disease), Hypertension, Psychiatric disorder,

## 2025-02-26 NOTE — PROGRESS NOTES
Nini Apple  : 1940  Primary: [facility] Dual Complete (Medicare Managed)  Secondary: [place]ICAID [place] [facility] [place] [address] [place] [address]  Phone: [phone]  Fax: [phone] Plan Frequency: 2x per week for up to 90 days    Plan of Care/Certification Expiration Date: 25        Plan of Care/Certification Expiration Date:  Plan of Care/Certification Expiration Date: 25    Frequency/Duration:   Plan Frequency: 2x per week for up to 90 days      Time In/Out:   Time In: 1325  Time Out: 1402      PT Visit Info:    Progress Note Counter: 1      Visit Count:  1    OUTPATIENT PHYSICAL THERAPY:   Treatment Note 2025       Episode  (s/p R TKA)               Treatment Diagnosis:    Acute pain of right knee  Stiffness of right knee, not elsewhere classified  Difficulty in walking, not elsewhere classified  Encounter for other orthopedic aftercare  Medical/Referring Diagnosis:    S/P total knee arthroplasty, right [Z96.651]    Referring Physician:  Rodrigo Aguilar MD MD Orders:  PT Eval and Treat   Return MD Appt:  26   Date of Onset:  25  Allergies:   Patient has no known allergies.  Restrictions/Precautions:   None      Interventions Planned (Treatment may consist of any combination of the following):     See Assessment Note    Subjective Comments:   Pt is s/p R TKA  Initial Pain Level:     10/10  Post Session Pain Level:      10/10  Medications Last Reviewed: 2025  Updated Objective Findings:  See Evaluation Note from today  Treatment     MANUAL THERAPY: (0 minutes): Joint mobilization, Soft tissue mobilization and Manipulation was utilized and necessary because of the patient's restricted joint motion, painful spasm, loss of articular motion and restricted motion of soft tissue.     THERAPEUTIC EXERCISE: (15 minutes):  Exercises per grid below to improve mobility, strength and balance.  Required minimal-moderate

## 2025-03-03 ENCOUNTER — HOSPITAL ENCOUNTER (OUTPATIENT)
Dept: PHYSICAL THERAPY | Age: 85
Setting detail: RECURRING SERIES
Discharge: HOME | End: 2025-03-05
Attending: ORTHOPAEDIC SURGERY
Payer: MEDICARE

## 2025-03-03 PROCEDURE — 97110 THERAPEUTIC EXERCISES: CPT

## 2025-03-03 PROCEDURE — 97140 MANUAL THERAPY 1/> REGIONS: CPT

## 2025-03-03 ASSESSMENT — PAIN SCALES - GENERAL: PAINLEVEL_OUTOF10: 10

## 2025-03-03 NOTE — PROGRESS NOTES
Nini Apple  : 1940  Primary: [facility] Dual Complete (Medicare Managed)  Secondary: [place] [facility] [place] [address]  [place] [address]  Phone: [phone]  Fax: [phone] Plan Frequency: 2x per week for up to 90 days    Plan of Care/Certification Expiration Date: 25        Plan of Care/Certification Expiration Date:  Plan of Care/Certification Expiration Date: 25    Frequency/Duration:   Plan Frequency: 2x per week for up to 90 days      Time In/Out:   Time In: 1258  Time Out: 1352    1306 - 6 minutes late   PT Visit Info:    Progress Note Counter: 2      Visit Count:  2    OUTPATIENT PHYSICAL THERAPY:   Treatment Note 3/3/2025       Episode  (s/p R TKA)               Treatment Diagnosis:    Acute pain of right knee  Stiffness of right knee, not elsewhere classified  Difficulty in walking, not elsewhere classified  Encounter for other orthopedic aftercare  Medical/Referring Diagnosis:    S/P total knee arthroplasty, right [Z96.651]    Referring Physician:  Rodrigo Aguilar MD MD Orders:  PT Eval and Treat   Return MD Appt:  26   Date of Onset:  25  Allergies:   Patient has no known allergies.  Restrictions/Precautions:   None      Interventions Planned (Treatment may consist of any combination of the following):     See Assessment Note    Subjective Comments:   Patient presents with 2 wheel rolling walker.  She reports pain in knee and back.   Initial Pain Level:     10/10  Post Session Pain Level:      8/10  Medications Last Reviewed: 3/3/2025  Updated Objective Findings:  None Today  Treatment     MANUAL THERAPY: (11 minutes): Joint mobilization, Soft tissue mobilization and Manipulation was utilized and necessary because of the patient's restricted joint motion, painful spasm, loss of articular motion and restricted motion of soft tissue.   + STM to areas around R knee.  Noted still has a few scabs on

## 2025-03-05 ENCOUNTER — APPOINTMENT (OUTPATIENT)
Dept: PHYSICAL THERAPY | Age: 85
End: 2025-03-05
Attending: ORTHOPAEDIC SURGERY
Payer: MEDICARE

## 2025-03-08 ENCOUNTER — HOSPITAL ENCOUNTER (INPATIENT)
Age: 85
LOS: 3 days | Discharge: HOME | DRG: 871 | End: 2025-03-12
Attending: EMERGENCY MEDICINE | Admitting: HOSPITALIST
Payer: MEDICARE

## 2025-03-08 ENCOUNTER — APPOINTMENT (OUTPATIENT)
Dept: CT IMAGING | Age: 85
DRG: 871 | End: 2025-03-08
Payer: MEDICARE

## 2025-03-08 DIAGNOSIS — J18.9 PNEUMONIA OF RIGHT LUNG DUE TO INFECTIOUS ORGANISM, UNSPECIFIED PART OF LUNG: ICD-10-CM

## 2025-03-08 DIAGNOSIS — R11.2 NAUSEA AND VOMITING, UNSPECIFIED VOMITING TYPE: Primary | ICD-10-CM

## 2025-03-08 DIAGNOSIS — R10.9 ABDOMINAL PAIN, UNSPECIFIED ABDOMINAL LOCATION: ICD-10-CM

## 2025-03-08 DIAGNOSIS — A41.9 SEPSIS, DUE TO UNSPECIFIED ORGANISM, UNSPECIFIED WHETHER ACUTE ORGAN DYSFUNCTION PRESENT (HCC): ICD-10-CM

## 2025-03-08 LAB
ALBUMIN SERPL-MCNC: 3.5 G/DL (ref 3.2–4.6)
ALBUMIN/GLOB SERPL: 1.1 (ref 1–1.9)
ALP SERPL-CCNC: 97 U/L (ref 35–104)
ALT SERPL-CCNC: 32 U/L (ref 8–45)
ANION GAP SERPL CALC-SCNC: 16 MMOL/L (ref 7–16)
AST SERPL-CCNC: 29 U/L (ref 15–37)
BASOPHILS # BLD: 0.03 K/UL (ref 0–0.2)
BASOPHILS NFR BLD: 0.2 % (ref 0–2)
BILIRUB SERPL-MCNC: 0.6 MG/DL (ref 0–1.2)
BUN SERPL-MCNC: 11 MG/DL (ref 8–23)
CALCIUM SERPL-MCNC: 10.3 MG/DL (ref 8.8–10.2)
CHLORIDE SERPL-SCNC: 103 MMOL/L (ref 98–107)
CO2 SERPL-SCNC: 20 MMOL/L (ref 20–29)
CREAT SERPL-MCNC: 1.02 MG/DL (ref 0.6–1.1)
DIFFERENTIAL METHOD BLD: ABNORMAL
EOSINOPHIL # BLD: 0.03 K/UL (ref 0–0.8)
EOSINOPHIL NFR BLD: 0.2 % (ref 0.5–7.8)
ERYTHROCYTE [DISTWIDTH] IN BLOOD BY AUTOMATED COUNT: 13.2 % (ref 11.9–14.6)
GLOBULIN SER CALC-MCNC: 3.1 G/DL (ref 2.3–3.5)
GLUCOSE SERPL-MCNC: 138 MG/DL (ref 70–99)
HCT VFR BLD AUTO: 40.1 % (ref 35.8–46.3)
HGB BLD-MCNC: 13.8 G/DL (ref 11.7–15.4)
IMM GRANULOCYTES # BLD AUTO: 0.05 K/UL (ref 0–0.5)
IMM GRANULOCYTES NFR BLD AUTO: 0.4 % (ref 0–5)
LIPASE SERPL-CCNC: 22 U/L (ref 13–60)
LYMPHOCYTES # BLD: 2.72 K/UL (ref 0.5–4.6)
LYMPHOCYTES NFR BLD: 20.5 % (ref 13–44)
MAGNESIUM SERPL-MCNC: 1.9 MG/DL (ref 1.8–2.4)
MCH RBC QN AUTO: 31.1 PG (ref 26.1–32.9)
MCHC RBC AUTO-ENTMCNC: 34.4 G/DL (ref 31.4–35)
MCV RBC AUTO: 90.3 FL (ref 82–102)
MONOCYTES # BLD: 0.79 K/UL (ref 0.1–1.3)
MONOCYTES NFR BLD: 5.9 % (ref 4–12)
NEUTS SEG # BLD: 9.68 K/UL (ref 1.7–8.2)
NEUTS SEG NFR BLD: 72.8 % (ref 43–78)
NRBC # BLD: 0 K/UL (ref 0–0.2)
PLATELET # BLD AUTO: 240 K/UL (ref 150–450)
PMV BLD AUTO: 9.5 FL (ref 9.4–12.3)
POTASSIUM SERPL-SCNC: 3.3 MMOL/L (ref 3.5–5.1)
PROT SERPL-MCNC: 6.7 G/DL (ref 6.3–8.2)
RBC # BLD AUTO: 4.44 M/UL (ref 4.05–5.2)
SODIUM SERPL-SCNC: 138 MMOL/L (ref 136–145)
WBC # BLD AUTO: 13.3 K/UL (ref 4.3–11.1)

## 2025-03-08 PROCEDURE — 74177 CT ABD & PELVIS W/CONTRAST: CPT

## 2025-03-08 PROCEDURE — 99285 EMERGENCY DEPT VISIT HI MDM: CPT

## 2025-03-08 PROCEDURE — 96375 TX/PRO/DX INJ NEW DRUG ADDON: CPT

## 2025-03-08 PROCEDURE — 96361 HYDRATE IV INFUSION ADD-ON: CPT

## 2025-03-08 PROCEDURE — 85025 COMPLETE CBC W/AUTO DIFF WBC: CPT

## 2025-03-08 PROCEDURE — 81001 URINALYSIS AUTO W/SCOPE: CPT

## 2025-03-08 PROCEDURE — 83690 ASSAY OF LIPASE: CPT

## 2025-03-08 PROCEDURE — 6360000002 HC RX W HCPCS: Performed by: EMERGENCY MEDICINE

## 2025-03-08 PROCEDURE — 2580000003 HC RX 258: Performed by: EMERGENCY MEDICINE

## 2025-03-08 PROCEDURE — 83735 ASSAY OF MAGNESIUM: CPT

## 2025-03-08 PROCEDURE — 6360000004 HC RX CONTRAST MEDICATION: Performed by: EMERGENCY MEDICINE

## 2025-03-08 PROCEDURE — 93005 ELECTROCARDIOGRAM TRACING: CPT | Performed by: EMERGENCY MEDICINE

## 2025-03-08 PROCEDURE — 80053 COMPREHEN METABOLIC PANEL: CPT

## 2025-03-08 RX ORDER — ONDANSETRON 2 MG/ML
4 INJECTION INTRAMUSCULAR; INTRAVENOUS ONCE
Status: COMPLETED | OUTPATIENT
Start: 2025-03-08 | End: 2025-03-08

## 2025-03-08 RX ORDER — MORPHINE SULFATE 4 MG/ML
4 INJECTION, SOLUTION INTRAMUSCULAR; INTRAVENOUS
Refills: 0 | Status: COMPLETED | OUTPATIENT
Start: 2025-03-08 | End: 2025-03-08

## 2025-03-08 RX ORDER — 0.9 % SODIUM CHLORIDE 0.9 %
1000 INTRAVENOUS SOLUTION INTRAVENOUS ONCE
Status: COMPLETED | OUTPATIENT
Start: 2025-03-08 | End: 2025-03-09

## 2025-03-08 RX ORDER — IOPAMIDOL 755 MG/ML
100 INJECTION, SOLUTION INTRAVASCULAR
Status: COMPLETED | OUTPATIENT
Start: 2025-03-08 | End: 2025-03-08

## 2025-03-08 RX ADMIN — ONDANSETRON 4 MG: 2 INJECTION, SOLUTION INTRAMUSCULAR; INTRAVENOUS at 23:25

## 2025-03-08 RX ADMIN — SODIUM CHLORIDE 1000 ML: 9 INJECTION, SOLUTION INTRAVENOUS at 23:25

## 2025-03-08 RX ADMIN — MORPHINE SULFATE 4 MG: 4 INJECTION INTRAVENOUS at 23:26

## 2025-03-08 RX ADMIN — IOPAMIDOL 100 ML: 755 INJECTION, SOLUTION INTRAVENOUS at 23:36

## 2025-03-08 ASSESSMENT — PAIN - FUNCTIONAL ASSESSMENT: PAIN_FUNCTIONAL_ASSESSMENT: 0-10

## 2025-03-08 ASSESSMENT — PAIN SCALES - GENERAL
PAINLEVEL_OUTOF10: 10
PAINLEVEL_OUTOF10: 10

## 2025-03-08 ASSESSMENT — PAIN DESCRIPTION - LOCATION
LOCATION: ABDOMEN;BACK
LOCATION: BACK;KNEE

## 2025-03-08 ASSESSMENT — PAIN DESCRIPTION - ORIENTATION
ORIENTATION: MID;LOWER
ORIENTATION: RIGHT

## 2025-03-08 ASSESSMENT — PAIN DESCRIPTION - DESCRIPTORS
DESCRIPTORS: ACHING;SORE;THROBBING
DESCRIPTORS: ACHING

## 2025-03-09 PROBLEM — K59.00 CONSTIPATION: Status: ACTIVE | Noted: 2025-03-09

## 2025-03-09 PROBLEM — K59.03 OPIOID-INDUCED CONSTIPATION: Status: ACTIVE | Noted: 2025-03-09

## 2025-03-09 PROBLEM — F11.90 CHRONIC, CONTINUOUS USE OF OPIOIDS: Status: ACTIVE | Noted: 2025-03-09

## 2025-03-09 PROBLEM — T40.2X5A OPIOID-INDUCED CONSTIPATION: Status: ACTIVE | Noted: 2025-03-09

## 2025-03-09 PROBLEM — A41.9 SEVERE SEPSIS: Status: ACTIVE | Noted: 2025-03-09

## 2025-03-09 PROBLEM — J18.9 COMMUNITY ACQUIRED PNEUMONIA, UNSPECIFIED LATERALITY: Status: ACTIVE | Noted: 2025-03-09

## 2025-03-09 PROBLEM — I70.91 ATHEROSCLEROSIS, GENERALIZED: Status: ACTIVE | Noted: 2025-03-09

## 2025-03-09 PROBLEM — R65.20 SEVERE SEPSIS: Status: ACTIVE | Noted: 2025-03-09

## 2025-03-09 LAB
ALBUMIN SERPL-MCNC: 3 G/DL (ref 3.2–4.6)
ALBUMIN/GLOB SERPL: 1 (ref 1–1.9)
ALP SERPL-CCNC: 83 U/L (ref 35–104)
ALT SERPL-CCNC: 31 U/L (ref 8–45)
ANION GAP SERPL CALC-SCNC: 16 MMOL/L (ref 7–16)
APPEARANCE UR: CLEAR
AST SERPL-CCNC: 37 U/L (ref 15–37)
BACTERIA URNS QL MICRO: ABNORMAL /HPF
BASOPHILS # BLD: 0.02 K/UL (ref 0–0.2)
BASOPHILS NFR BLD: 0.1 % (ref 0–2)
BILIRUB SERPL-MCNC: 0.4 MG/DL (ref 0–1.2)
BILIRUB UR QL: NEGATIVE
BUN SERPL-MCNC: 13 MG/DL (ref 8–23)
CALCIUM SERPL-MCNC: 9.3 MG/DL (ref 8.8–10.2)
CASTS URNS QL MICRO: ABNORMAL /LPF
CHLORIDE SERPL-SCNC: 104 MMOL/L (ref 98–107)
CO2 SERPL-SCNC: 19 MMOL/L (ref 20–29)
COLOR UR: ABNORMAL
CREAT SERPL-MCNC: 1.17 MG/DL (ref 0.6–1.1)
CRYSTALS URNS QL MICRO: 0 /LPF
DIFFERENTIAL METHOD BLD: ABNORMAL
EKG ATRIAL RATE: 122 BPM
EKG DIAGNOSIS: NORMAL
EKG P AXIS: 74 DEGREES
EKG P-R INTERVAL: 166 MS
EKG Q-T INTERVAL: 306 MS
EKG QRS DURATION: 72 MS
EKG QTC CALCULATION (BAZETT): 436 MS
EKG R AXIS: 61 DEGREES
EKG T AXIS: 67 DEGREES
EKG VENTRICULAR RATE: 122 BPM
EOSINOPHIL # BLD: 0 K/UL (ref 0–0.8)
EOSINOPHIL NFR BLD: 0 % (ref 0.5–7.8)
EPI CELLS #/AREA URNS HPF: ABNORMAL /HPF
ERYTHROCYTE [DISTWIDTH] IN BLOOD BY AUTOMATED COUNT: 13.4 % (ref 11.9–14.6)
GLOBULIN SER CALC-MCNC: 3 G/DL (ref 2.3–3.5)
GLUCOSE SERPL-MCNC: 198 MG/DL (ref 70–99)
GLUCOSE UR STRIP.AUTO-MCNC: NEGATIVE MG/DL
HCT VFR BLD AUTO: 40.4 % (ref 35.8–46.3)
HGB BLD-MCNC: 13.7 G/DL (ref 11.7–15.4)
HGB UR QL STRIP: NEGATIVE
IMM GRANULOCYTES # BLD AUTO: 0.09 K/UL (ref 0–0.5)
IMM GRANULOCYTES NFR BLD AUTO: 0.5 % (ref 0–5)
KETONES UR QL STRIP.AUTO: 15 MG/DL
LACTATE SERPL-SCNC: 1.5 MMOL/L (ref 0.5–2)
LACTATE SERPL-SCNC: 2.9 MMOL/L (ref 0.5–2)
LACTATE SERPL-SCNC: 3 MMOL/L (ref 0.5–2)
LACTATE SERPL-SCNC: 3.5 MMOL/L (ref 0.5–2)
LACTATE SERPL-SCNC: 3.6 MMOL/L (ref 0.5–2)
LACTATE SERPL-SCNC: 4.2 MMOL/L (ref 0.5–2)
LEUKOCYTE ESTERASE UR QL STRIP.AUTO: ABNORMAL
LYMPHOCYTES # BLD: 0.93 K/UL (ref 0.5–4.6)
LYMPHOCYTES NFR BLD: 5.2 % (ref 13–44)
MAGNESIUM SERPL-MCNC: 2 MG/DL (ref 1.8–2.4)
MCH RBC QN AUTO: 31.3 PG (ref 26.1–32.9)
MCHC RBC AUTO-ENTMCNC: 33.9 G/DL (ref 31.4–35)
MCV RBC AUTO: 92.2 FL (ref 82–102)
MONOCYTES # BLD: 0.66 K/UL (ref 0.1–1.3)
MONOCYTES NFR BLD: 3.7 % (ref 4–12)
MUCOUS THREADS URNS QL MICRO: ABNORMAL /LPF
NEUTS SEG # BLD: 16.09 K/UL (ref 1.7–8.2)
NEUTS SEG NFR BLD: 90.5 % (ref 43–78)
NITRITE UR QL STRIP.AUTO: NEGATIVE
NRBC # BLD: 0 K/UL (ref 0–0.2)
OTHER OBSERVATIONS: ABNORMAL
PH UR STRIP: 5.5 (ref 5–9)
PLATELET # BLD AUTO: 218 K/UL (ref 150–450)
PMV BLD AUTO: 9.4 FL (ref 9.4–12.3)
POTASSIUM SERPL-SCNC: 3.4 MMOL/L (ref 3.5–5.1)
PROCALCITONIN SERPL-MCNC: 0.35 NG/ML (ref 0–0.1)
PROT SERPL-MCNC: 6 G/DL (ref 6.3–8.2)
PROT UR STRIP-MCNC: NEGATIVE MG/DL
RBC # BLD AUTO: 4.38 M/UL (ref 4.05–5.2)
RBC #/AREA URNS HPF: ABNORMAL /HPF
SODIUM SERPL-SCNC: 139 MMOL/L (ref 136–145)
SP GR UR REFRACTOMETRY: 1.02 (ref 1–1.02)
URINE CULTURE IF INDICATED: ABNORMAL
UROBILINOGEN UR QL STRIP.AUTO: 1 EU/DL (ref 0.2–1)
WBC # BLD AUTO: 17.8 K/UL (ref 4.3–11.1)
WBC URNS QL MICRO: ABNORMAL /HPF

## 2025-03-09 PROCEDURE — 6370000000 HC RX 637 (ALT 250 FOR IP): Performed by: EMERGENCY MEDICINE

## 2025-03-09 PROCEDURE — 2580000003 HC RX 258: Performed by: EMERGENCY MEDICINE

## 2025-03-09 PROCEDURE — 83735 ASSAY OF MAGNESIUM: CPT

## 2025-03-09 PROCEDURE — 2500000003 HC RX 250 WO HCPCS: Performed by: EMERGENCY MEDICINE

## 2025-03-09 PROCEDURE — 6360000002 HC RX W HCPCS: Performed by: HOSPITALIST

## 2025-03-09 PROCEDURE — 85025 COMPLETE CBC W/AUTO DIFF WBC: CPT

## 2025-03-09 PROCEDURE — 6360000002 HC RX W HCPCS: Performed by: EMERGENCY MEDICINE

## 2025-03-09 PROCEDURE — 6370000000 HC RX 637 (ALT 250 FOR IP): Performed by: HOSPITALIST

## 2025-03-09 PROCEDURE — 2500000003 HC RX 250 WO HCPCS: Performed by: HOSPITALIST

## 2025-03-09 PROCEDURE — 80053 COMPREHEN METABOLIC PANEL: CPT

## 2025-03-09 PROCEDURE — 96375 TX/PRO/DX INJ NEW DRUG ADDON: CPT

## 2025-03-09 PROCEDURE — 93010 ELECTROCARDIOGRAM REPORT: CPT | Performed by: INTERNAL MEDICINE

## 2025-03-09 PROCEDURE — 96365 THER/PROPH/DIAG IV INF INIT: CPT

## 2025-03-09 PROCEDURE — 36415 COLL VENOUS BLD VENIPUNCTURE: CPT

## 2025-03-09 PROCEDURE — 2580000003 HC RX 258: Performed by: STUDENT IN AN ORGANIZED HEALTH CARE EDUCATION/TRAINING PROGRAM

## 2025-03-09 PROCEDURE — 83605 ASSAY OF LACTIC ACID: CPT

## 2025-03-09 PROCEDURE — 87040 BLOOD CULTURE FOR BACTERIA: CPT

## 2025-03-09 PROCEDURE — 1100000003 HC PRIVATE W/ TELEMETRY

## 2025-03-09 PROCEDURE — 84145 PROCALCITONIN (PCT): CPT

## 2025-03-09 RX ORDER — ENOXAPARIN SODIUM 100 MG/ML
40 INJECTION SUBCUTANEOUS DAILY
Status: DISCONTINUED | OUTPATIENT
Start: 2025-03-09 | End: 2025-03-12 | Stop reason: HOSPADM

## 2025-03-09 RX ORDER — SODIUM CHLORIDE 0.9 % (FLUSH) 0.9 %
5-40 SYRINGE (ML) INJECTION EVERY 12 HOURS SCHEDULED
Status: DISCONTINUED | OUTPATIENT
Start: 2025-03-09 | End: 2025-03-12 | Stop reason: HOSPADM

## 2025-03-09 RX ORDER — PANTOPRAZOLE SODIUM 40 MG/1
40 TABLET, DELAYED RELEASE ORAL
Status: DISCONTINUED | OUTPATIENT
Start: 2025-03-09 | End: 2025-03-12 | Stop reason: HOSPADM

## 2025-03-09 RX ORDER — POLYETHYLENE GLYCOL 3350 17 G/17G
17 POWDER, FOR SOLUTION ORAL DAILY PRN
Status: DISCONTINUED | OUTPATIENT
Start: 2025-03-09 | End: 2025-03-12 | Stop reason: HOSPADM

## 2025-03-09 RX ORDER — SODIUM CHLORIDE 9 MG/ML
INJECTION, SOLUTION INTRAVENOUS PRN
Status: DISCONTINUED | OUTPATIENT
Start: 2025-03-09 | End: 2025-03-12 | Stop reason: HOSPADM

## 2025-03-09 RX ORDER — AMLODIPINE BESYLATE 5 MG/1
5 TABLET ORAL DAILY
Status: DISCONTINUED | OUTPATIENT
Start: 2025-03-09 | End: 2025-03-12 | Stop reason: HOSPADM

## 2025-03-09 RX ORDER — SODIUM CHLORIDE 0.9 % (FLUSH) 0.9 %
5-40 SYRINGE (ML) INJECTION PRN
Status: DISCONTINUED | OUTPATIENT
Start: 2025-03-09 | End: 2025-03-12 | Stop reason: HOSPADM

## 2025-03-09 RX ORDER — 0.9 % SODIUM CHLORIDE 0.9 %
1000 INTRAVENOUS SOLUTION INTRAVENOUS ONCE
Status: COMPLETED | OUTPATIENT
Start: 2025-03-09 | End: 2025-03-09

## 2025-03-09 RX ORDER — ONDANSETRON 2 MG/ML
4 INJECTION INTRAMUSCULAR; INTRAVENOUS EVERY 6 HOURS PRN
Status: DISCONTINUED | OUTPATIENT
Start: 2025-03-09 | End: 2025-03-12 | Stop reason: HOSPADM

## 2025-03-09 RX ORDER — BISACODYL 10 MG
10 SUPPOSITORY, RECTAL RECTAL DAILY
Status: DISCONTINUED | OUTPATIENT
Start: 2025-03-09 | End: 2025-03-09

## 2025-03-09 RX ORDER — DOXYCYCLINE 100 MG/1
100 CAPSULE ORAL EVERY 12 HOURS SCHEDULED
Status: DISCONTINUED | OUTPATIENT
Start: 2025-03-09 | End: 2025-03-12 | Stop reason: HOSPADM

## 2025-03-09 RX ORDER — MAGNESIUM HYDROXIDE/ALUMINUM HYDROXICE/SIMETHICONE 120; 1200; 1200 MG/30ML; MG/30ML; MG/30ML
30 SUSPENSION ORAL
Status: COMPLETED | OUTPATIENT
Start: 2025-03-09 | End: 2025-03-09

## 2025-03-09 RX ORDER — HYDRALAZINE HYDROCHLORIDE 20 MG/ML
10 INJECTION INTRAMUSCULAR; INTRAVENOUS EVERY 6 HOURS PRN
Status: DISCONTINUED | OUTPATIENT
Start: 2025-03-09 | End: 2025-03-12 | Stop reason: HOSPADM

## 2025-03-09 RX ORDER — ACETAMINOPHEN 325 MG/1
650 TABLET ORAL EVERY 6 HOURS PRN
Status: DISCONTINUED | OUTPATIENT
Start: 2025-03-09 | End: 2025-03-12 | Stop reason: HOSPADM

## 2025-03-09 RX ORDER — GABAPENTIN 300 MG/1
300 CAPSULE ORAL 2 TIMES DAILY
Status: DISCONTINUED | OUTPATIENT
Start: 2025-03-09 | End: 2025-03-12 | Stop reason: HOSPADM

## 2025-03-09 RX ORDER — POLYETHYLENE GLYCOL 3350 17 G/17G
17 POWDER, FOR SOLUTION ORAL 2 TIMES DAILY
Status: DISCONTINUED | OUTPATIENT
Start: 2025-03-09 | End: 2025-03-12 | Stop reason: HOSPADM

## 2025-03-09 RX ORDER — ASPIRIN 81 MG/1
81 TABLET ORAL 2 TIMES DAILY
Status: DISCONTINUED | OUTPATIENT
Start: 2025-03-09 | End: 2025-03-12 | Stop reason: HOSPADM

## 2025-03-09 RX ORDER — ACETAMINOPHEN 650 MG/1
650 SUPPOSITORY RECTAL EVERY 6 HOURS PRN
Status: DISCONTINUED | OUTPATIENT
Start: 2025-03-09 | End: 2025-03-12 | Stop reason: HOSPADM

## 2025-03-09 RX ORDER — BISACODYL 10 MG
10 SUPPOSITORY, RECTAL RECTAL DAILY PRN
Status: DISCONTINUED | OUTPATIENT
Start: 2025-03-09 | End: 2025-03-12 | Stop reason: HOSPADM

## 2025-03-09 RX ORDER — 0.9 % SODIUM CHLORIDE 0.9 %
30 INTRAVENOUS SOLUTION INTRAVENOUS ONCE
Status: COMPLETED | OUTPATIENT
Start: 2025-03-09 | End: 2025-03-09

## 2025-03-09 RX ORDER — SODIUM CHLORIDE 9 MG/ML
INJECTION, SOLUTION INTRAVENOUS CONTINUOUS
Status: DISCONTINUED | OUTPATIENT
Start: 2025-03-09 | End: 2025-03-09

## 2025-03-09 RX ORDER — ONDANSETRON 4 MG/1
4 TABLET, ORALLY DISINTEGRATING ORAL EVERY 8 HOURS PRN
Status: DISCONTINUED | OUTPATIENT
Start: 2025-03-09 | End: 2025-03-12 | Stop reason: HOSPADM

## 2025-03-09 RX ORDER — OXYCODONE AND ACETAMINOPHEN 10; 325 MG/1; MG/1
1 TABLET ORAL EVERY 6 HOURS PRN
Refills: 0 | Status: DISCONTINUED | OUTPATIENT
Start: 2025-03-09 | End: 2025-03-12 | Stop reason: HOSPADM

## 2025-03-09 RX ADMIN — PANTOPRAZOLE SODIUM 40 MG: 40 TABLET, DELAYED RELEASE ORAL at 06:39

## 2025-03-09 RX ADMIN — ASPIRIN 81 MG: 81 TABLET ORAL at 08:51

## 2025-03-09 RX ADMIN — GABAPENTIN 300 MG: 300 CAPSULE ORAL at 08:51

## 2025-03-09 RX ADMIN — SODIUM CHLORIDE, PRESERVATIVE FREE 5 ML: 5 INJECTION INTRAVENOUS at 08:52

## 2025-03-09 RX ADMIN — GABAPENTIN 300 MG: 300 CAPSULE ORAL at 20:53

## 2025-03-09 RX ADMIN — POLYETHYLENE GLYCOL 3350 17 G: 17 POWDER, FOR SOLUTION ORAL at 06:39

## 2025-03-09 RX ADMIN — ONDANSETRON 4 MG: 2 INJECTION, SOLUTION INTRAMUSCULAR; INTRAVENOUS at 05:45

## 2025-03-09 RX ADMIN — BISACODYL 10 MG: 10 SUPPOSITORY RECTAL at 05:31

## 2025-03-09 RX ADMIN — SODIUM CHLORIDE, PRESERVATIVE FREE 5 ML: 5 INJECTION INTRAVENOUS at 20:53

## 2025-03-09 RX ADMIN — POLYETHYLENE GLYCOL 3350 17 G: 17 POWDER, FOR SOLUTION ORAL at 20:52

## 2025-03-09 RX ADMIN — WATER 1000 MG: 1 INJECTION INTRAMUSCULAR; INTRAVENOUS; SUBCUTANEOUS at 00:45

## 2025-03-09 RX ADMIN — ALUMINUM HYDROXIDE, MAGNESIUM HYDROXIDE, AND SIMETHICONE 30 ML: 200; 200; 20 SUSPENSION ORAL at 03:23

## 2025-03-09 RX ADMIN — AMLODIPINE BESYLATE 5 MG: 5 TABLET ORAL at 08:51

## 2025-03-09 RX ADMIN — SODIUM CHLORIDE 2034 ML: 9 INJECTION, SOLUTION INTRAVENOUS at 01:50

## 2025-03-09 RX ADMIN — AZITHROMYCIN MONOHYDRATE 500 MG: 500 INJECTION, POWDER, LYOPHILIZED, FOR SOLUTION INTRAVENOUS at 00:48

## 2025-03-09 RX ADMIN — DOXYCYCLINE HYCLATE 100 MG: 100 CAPSULE ORAL at 20:53

## 2025-03-09 RX ADMIN — ENOXAPARIN SODIUM 40 MG: 100 INJECTION SUBCUTANEOUS at 08:50

## 2025-03-09 RX ADMIN — SODIUM CHLORIDE 1000 ML: 9 INJECTION, SOLUTION INTRAVENOUS at 11:34

## 2025-03-09 RX ADMIN — DOXYCYCLINE HYCLATE 100 MG: 100 CAPSULE ORAL at 08:51

## 2025-03-09 RX ADMIN — ASPIRIN 81 MG: 81 TABLET ORAL at 20:53

## 2025-03-09 ASSESSMENT — PAIN SCALES - GENERAL
PAINLEVEL_OUTOF10: 0
PAINLEVEL_OUTOF10: 0

## 2025-03-09 NOTE — ED NOTES
TRANSFER - OUT REPORT:    Verbal report given to Kirit GOLD on Nini Apple  being transferred to [facility] for routine progression of patient care       Report consisted of patient's Situation, Background, Assessment and   Recommendations(SBAR).     Information from the following report(s) Nurse Handoff Report was reviewed with the receiving nurse.    Lines:   Peripheral IV 03/08/25 Right Forearm (Active)       Peripheral IV 03/09/25 Left Antecubital (Active)        Opportunity for questions and clarification was provided.      Patient transported with:  Registered Nurse      Mateo, Yani, RN  03/09/25 2112

## 2025-03-09 NOTE — H&P
Hospitalist History and Physical   Admit Date:  3/8/2025  9:43 PM   Name:  Nini Apple   Age:  84 y.o.  Sex:  female  :  1940   MRN:  [id number]   Room:  [place] [address]    Presenting/Chief Complaint: Vomiting     Reason(s) for Admission: Community acquired pneumonia, unspecified laterality [J18.9]     History of Present Illness:     84 years old female with past medical history of hypertension, peripheral neuropathy, GERD, chronic back pain was recently started on oxycodone 10 mg/325 presented to emergency room with chief complaint of constipation going on for past 4 days duration, abdominal pain for the upper abdomen associated with nausea, vomiting started today.  History of some shortness of breath, cough.  Evaluated in emergency room, CT scan of abdomen was obtained, CT scan of abdomen shows slight middle lobe pneumonia, evidence of constipation.  Lab work shows evidence of elevated white count at 13,000, lactic acid is 2.6.  With these findings emergency room physician requested admission of this patient for further evaluation.              Assessment & Plan:     Community acquired pneumonia: Initiated on antibiotics, follow cultures.  Lactic acidosis likely secondary to pneumonia.    Constipation, abdominal pain: Patient is on Linzess which will be continued, will order Dulcolax suppository and also MiraLAX.  Constipation likely secondary to 10 mg of Percocet thinking every 4 hours.    Hypertension: Continue on amlodipine.    GERD: Continue on omeprazole.    Peripheral neuropathy: Continue on gabapentin.      Diet: ADULT DIET; Regular  VTE prophylaxis: Lovenox  Code status: Full Code discussed CODE STATUS with patient and patient would like to be full CODE STATUS.      Non-peripheral Lines and Tubes (if present):             Hospital Problems:  Principal Problem:    Community acquired pneumonia, unspecified laterality  Active Problems:    HTN (hypertension)    GERD (gastroesophageal reflux disease)

## 2025-03-09 NOTE — ED TRIAGE NOTES
Patient arrives with family from home. Patient states she has had nausea and vomiting all week. Patient states she has not had a bowel movement today. Patient states she has intense right knee and back pain at time of triage. Patient states she has mid abdominal pain. Patient denies blood in her vomit.

## 2025-03-09 NOTE — ED PROVIDER NOTES
[employer] Emergency Department Provider Note                   PCP:                No primary care provider on file.               Age: 84 y.o.      Sex: female     MEDICAL DECISION MAKING  Complexity of Problems Addressed:   1 or more acute illness/injury that poses a threat to life or bodily function    Data Reviewed and Analyzed:  Category 1:    I have reviewed outside records from an external source for any pertinent PMH, ED visits, primary care visits, specialist visits, labs, EKG, and/or radiologic studies.  I reviewed external records: provider visit note from outside specialist.     Category 2:       I independently ordered and reviewed the labs.    I have reviewed the Radiologist interpretation of the radiologic studies. My medical decision making regarding the radiologic studies is based on the interpretation report of the board certified Radiologist.      The patient was admitted and I have discussed patient management with the admitting provider.    Category 3:     Discussion of management or test interpretation:    MDM  Number of Diagnoses or Management Options  Abdominal pain, unspecified abdominal location  Nausea and vomiting, unspecified vomiting type  Pneumonia of right lung due to infectious organism, unspecified part of lung  Sepsis, due to unspecified organism, unspecified whether acute organ dysfunction present (HCC)  Diagnosis management comments: Patient presented for evaluation of nausea, vomiting, abdominal pain, and constipation.  Her abdomen pelvic CT did not show any acute intra-abdominal pelvic process but does show a right-sided pneumonia.  Patient's white count is elevated at 13,000.  I did order broad-spectrum antibiotics and blood cultures.  Her lactic acid came back elevated at 3.6.  She was mildly tachycardic with elevated blood pressure.  I did order sepsis fluid bolus of 30 cc/kg and serial lactic acids.  Hospitalist was consulted for admission.          Nini Apple is a 84

## 2025-03-09 NOTE — PLAN OF CARE
Problem: Discharge Planning  Goal: Discharge to home or other facility with appropriate resources  Outcome: Progressing  Flowsheets (Taken 3/9/2025 8461)  Discharge to home or other facility with appropriate resources: Identify barriers to discharge with patient and caregiver     Problem: Pain  Goal: Verbalizes/displays adequate comfort level or baseline comfort level  Outcome: Progressing     Problem: Safety - Adult  Goal: Free from fall injury  Outcome: Progressing

## 2025-03-10 PROBLEM — E87.6 HYPOKALEMIA: Status: ACTIVE | Noted: 2025-03-10

## 2025-03-10 LAB
ALBUMIN SERPL-MCNC: 2.4 G/DL (ref 3.2–4.6)
ALBUMIN/GLOB SERPL: 0.9 (ref 1–1.9)
ALP SERPL-CCNC: 63 U/L (ref 35–104)
ALT SERPL-CCNC: 20 U/L (ref 8–45)
ANION GAP SERPL CALC-SCNC: 8 MMOL/L (ref 7–16)
AST SERPL-CCNC: 22 U/L (ref 15–37)
BASOPHILS # BLD: 0.03 K/UL (ref 0–0.2)
BASOPHILS NFR BLD: 0.2 % (ref 0–2)
BILIRUB SERPL-MCNC: 0.4 MG/DL (ref 0–1.2)
BUN SERPL-MCNC: 17 MG/DL (ref 8–23)
CALCIUM SERPL-MCNC: 8.6 MG/DL (ref 8.8–10.2)
CHLORIDE SERPL-SCNC: 111 MMOL/L (ref 98–107)
CO2 SERPL-SCNC: 23 MMOL/L (ref 20–29)
CREAT SERPL-MCNC: 1.04 MG/DL (ref 0.6–1.1)
DIFFERENTIAL METHOD BLD: ABNORMAL
EOSINOPHIL # BLD: 0.01 K/UL (ref 0–0.8)
EOSINOPHIL NFR BLD: 0.1 % (ref 0.5–7.8)
ERYTHROCYTE [DISTWIDTH] IN BLOOD BY AUTOMATED COUNT: 13.6 % (ref 11.9–14.6)
GLOBULIN SER CALC-MCNC: 2.5 G/DL (ref 2.3–3.5)
GLUCOSE SERPL-MCNC: 106 MG/DL (ref 70–99)
HCT VFR BLD AUTO: 33.9 % (ref 35.8–46.3)
HGB BLD-MCNC: 11.1 G/DL (ref 11.7–15.4)
IMM GRANULOCYTES # BLD AUTO: 0.08 K/UL (ref 0–0.5)
IMM GRANULOCYTES NFR BLD AUTO: 0.6 % (ref 0–5)
LYMPHOCYTES # BLD: 1.93 K/UL (ref 0.5–4.6)
LYMPHOCYTES NFR BLD: 14 % (ref 13–44)
MAGNESIUM SERPL-MCNC: 1.9 MG/DL (ref 1.8–2.4)
MCH RBC QN AUTO: 31.5 PG (ref 26.1–32.9)
MCHC RBC AUTO-ENTMCNC: 32.7 G/DL (ref 31.4–35)
MCV RBC AUTO: 96.3 FL (ref 82–102)
MONOCYTES # BLD: 1.22 K/UL (ref 0.1–1.3)
MONOCYTES NFR BLD: 8.8 % (ref 4–12)
NEUTS SEG # BLD: 10.53 K/UL (ref 1.7–8.2)
NEUTS SEG NFR BLD: 76.3 % (ref 43–78)
NRBC # BLD: 0 K/UL (ref 0–0.2)
PLATELET # BLD AUTO: 194 K/UL (ref 150–450)
PMV BLD AUTO: 10.7 FL (ref 9.4–12.3)
POTASSIUM SERPL-SCNC: 3.4 MMOL/L (ref 3.5–5.1)
PROT SERPL-MCNC: 4.9 G/DL (ref 6.3–8.2)
RBC # BLD AUTO: 3.52 M/UL (ref 4.05–5.2)
SODIUM SERPL-SCNC: 142 MMOL/L (ref 136–145)
WBC # BLD AUTO: 13.8 K/UL (ref 4.3–11.1)

## 2025-03-10 PROCEDURE — 6370000000 HC RX 637 (ALT 250 FOR IP): Performed by: STUDENT IN AN ORGANIZED HEALTH CARE EDUCATION/TRAINING PROGRAM

## 2025-03-10 PROCEDURE — 1100000003 HC PRIVATE W/ TELEMETRY

## 2025-03-10 PROCEDURE — 83735 ASSAY OF MAGNESIUM: CPT

## 2025-03-10 PROCEDURE — 97530 THERAPEUTIC ACTIVITIES: CPT

## 2025-03-10 PROCEDURE — 6360000002 HC RX W HCPCS: Performed by: HOSPITALIST

## 2025-03-10 PROCEDURE — 97165 OT EVAL LOW COMPLEX 30 MIN: CPT

## 2025-03-10 PROCEDURE — 6370000000 HC RX 637 (ALT 250 FOR IP): Performed by: HOSPITALIST

## 2025-03-10 PROCEDURE — 2500000003 HC RX 250 WO HCPCS: Performed by: HOSPITALIST

## 2025-03-10 PROCEDURE — 80053 COMPREHEN METABOLIC PANEL: CPT

## 2025-03-10 PROCEDURE — 97535 SELF CARE MNGMENT TRAINING: CPT

## 2025-03-10 PROCEDURE — 85025 COMPLETE CBC W/AUTO DIFF WBC: CPT

## 2025-03-10 PROCEDURE — 97161 PT EVAL LOW COMPLEX 20 MIN: CPT

## 2025-03-10 PROCEDURE — 36415 COLL VENOUS BLD VENIPUNCTURE: CPT

## 2025-03-10 RX ORDER — POTASSIUM CHLORIDE 7.45 MG/ML
10 INJECTION INTRAVENOUS PRN
Status: DISCONTINUED | OUTPATIENT
Start: 2025-03-10 | End: 2025-03-12 | Stop reason: HOSPADM

## 2025-03-10 RX ORDER — LOPERAMIDE HYDROCHLORIDE 2 MG/1
2 CAPSULE ORAL 4 TIMES DAILY PRN
Status: DISCONTINUED | OUTPATIENT
Start: 2025-03-10 | End: 2025-03-12 | Stop reason: HOSPADM

## 2025-03-10 RX ORDER — MAGNESIUM SULFATE IN WATER 40 MG/ML
2000 INJECTION, SOLUTION INTRAVENOUS PRN
Status: DISCONTINUED | OUTPATIENT
Start: 2025-03-10 | End: 2025-03-12 | Stop reason: HOSPADM

## 2025-03-10 RX ORDER — POTASSIUM CHLORIDE 1500 MG/1
40 TABLET, EXTENDED RELEASE ORAL PRN
Status: DISCONTINUED | OUTPATIENT
Start: 2025-03-10 | End: 2025-03-12 | Stop reason: HOSPADM

## 2025-03-10 RX ORDER — LOPERAMIDE HYDROCHLORIDE 2 MG/1
2 CAPSULE ORAL ONCE
Status: COMPLETED | OUTPATIENT
Start: 2025-03-10 | End: 2025-03-10

## 2025-03-10 RX ADMIN — ASPIRIN 81 MG: 81 TABLET ORAL at 21:24

## 2025-03-10 RX ADMIN — OXYCODONE AND ACETAMINOPHEN 1 TABLET: 10; 325 TABLET ORAL at 01:09

## 2025-03-10 RX ADMIN — ASPIRIN 81 MG: 81 TABLET ORAL at 07:45

## 2025-03-10 RX ADMIN — DOXYCYCLINE HYCLATE 100 MG: 100 CAPSULE ORAL at 07:46

## 2025-03-10 RX ADMIN — POTASSIUM BICARBONATE 20 MEQ: 782 TABLET, EFFERVESCENT ORAL at 14:13

## 2025-03-10 RX ADMIN — ENOXAPARIN SODIUM 40 MG: 100 INJECTION SUBCUTANEOUS at 07:45

## 2025-03-10 RX ADMIN — OXYCODONE AND ACETAMINOPHEN 1 TABLET: 10; 325 TABLET ORAL at 21:24

## 2025-03-10 RX ADMIN — GABAPENTIN 300 MG: 300 CAPSULE ORAL at 21:24

## 2025-03-10 RX ADMIN — GABAPENTIN 300 MG: 300 CAPSULE ORAL at 07:45

## 2025-03-10 RX ADMIN — DOXYCYCLINE HYCLATE 100 MG: 100 CAPSULE ORAL at 21:24

## 2025-03-10 RX ADMIN — LOPERAMIDE HYDROCHLORIDE 2 MG: 2 CAPSULE ORAL at 16:19

## 2025-03-10 RX ADMIN — OXYCODONE AND ACETAMINOPHEN 1 TABLET: 10; 325 TABLET ORAL at 14:13

## 2025-03-10 RX ADMIN — SODIUM CHLORIDE, PRESERVATIVE FREE 5 ML: 5 INJECTION INTRAVENOUS at 07:44

## 2025-03-10 RX ADMIN — WATER 1000 MG: 1 INJECTION INTRAMUSCULAR; INTRAVENOUS; SUBCUTANEOUS at 01:06

## 2025-03-10 RX ADMIN — PANTOPRAZOLE SODIUM 40 MG: 40 TABLET, DELAYED RELEASE ORAL at 05:37

## 2025-03-10 RX ADMIN — SODIUM CHLORIDE, PRESERVATIVE FREE 10 ML: 5 INJECTION INTRAVENOUS at 21:24

## 2025-03-10 RX ADMIN — AMLODIPINE BESYLATE 5 MG: 5 TABLET ORAL at 07:46

## 2025-03-10 RX ADMIN — POLYETHYLENE GLYCOL 3350 17 G: 17 POWDER, FOR SOLUTION ORAL at 07:45

## 2025-03-10 ASSESSMENT — PAIN DESCRIPTION - LOCATION
LOCATION: KNEE
LOCATION: KNEE;LEG

## 2025-03-10 ASSESSMENT — PAIN DESCRIPTION - ONSET
ONSET: GRADUAL
ONSET: GRADUAL

## 2025-03-10 ASSESSMENT — PAIN SCALES - GENERAL
PAINLEVEL_OUTOF10: 8
PAINLEVEL_OUTOF10: 0
PAINLEVEL_OUTOF10: 8
PAINLEVEL_OUTOF10: 8
PAINLEVEL_OUTOF10: 0
PAINLEVEL_OUTOF10: 0

## 2025-03-10 ASSESSMENT — PAIN DESCRIPTION - ORIENTATION
ORIENTATION: RIGHT

## 2025-03-10 ASSESSMENT — PAIN DESCRIPTION - PAIN TYPE
TYPE: CHRONIC PAIN

## 2025-03-10 ASSESSMENT — PAIN DESCRIPTION - DESCRIPTORS
DESCRIPTORS: ACHING;SORE
DESCRIPTORS: ACHING
DESCRIPTORS: ACHING;DISCOMFORT
DESCRIPTORS: ACHING

## 2025-03-10 ASSESSMENT — PAIN - FUNCTIONAL ASSESSMENT
PAIN_FUNCTIONAL_ASSESSMENT: PREVENTS OR INTERFERES SOME ACTIVE ACTIVITIES AND ADLS

## 2025-03-10 ASSESSMENT — PAIN DESCRIPTION - FREQUENCY
FREQUENCY: INTERMITTENT
FREQUENCY: INTERMITTENT

## 2025-03-10 NOTE — CARE COORDINATION
Chart reviewed by  for potential transition of care (REJI) needs or concerns.  Pt is insured with pharmacy benefits through Medicaid.  CM met with pt to discuss need for PCP.  Pt confirmed that she sees a PA at [facility] but stated she would like to change providers so that she can see an MD only.  She is agreeable with a referral to [facility].  Referral submitted.  CM was current with outpatient PT prior to admission and will resume these services at MD.  CM remains available to assist as needed. Please notify/consult  if REJI needs arise.       03/10/25 1681   Service Assessment   Patient Orientation Alert and Oriented   Cognition Alert   History Provided By Patient;Medical Record   Primary Caregiver Self   Accompanied By/Relationship n/a   Support Systems Children;Family Members   Patient's Healthcare Decision Maker is: Legal Next of Kin   PCP Verified by CM Yes  (PA at [facility]; pt requesting to change to a [facility] PCP so she can be seen by a MD, not midlevel provider.  Referral sent.)   Last Visit to PCP Within last 3 months   Prior Functional Level Independent in ADLs/IADLs   Current Functional Level Independent in ADLs/IADLs;Assistance with the following:;Mobility   Can patient return to prior living arrangement Yes   Ability to make needs known: Good   Family able to assist with home care needs: Yes   Would you like for me to discuss the discharge plan with any other family members/significant others, and if so, who? Yes  (Dtr/Taina Castrejon)   Financial Resources Medicare;Medicaid   Community Resources Other (Comment)  ( Outpatient Rehab Services)   Social/Functional History   Lives With Alone   Type of Home House   Home Layout One level   Home Access Ramped entrance   Bathroom Shower/Tub Tub/Shower unit   Bathroom Equipment Shower chair   Home Equipment Cane;Walker - Rolling   Receives Help From Outpatient therapy   Prior Level of Assist for ADLs

## 2025-03-10 NOTE — THERAPY EVALUATION
ACUTE PHYSICAL THERAPY GOALS:   (Developed with and agreed upon by patient and/or caregiver.)    (1.) Nini Apple  will move from supine to sit and sit to supine  and scoot up and down with INDEPENDENCE within 7 treatment day(s).    (2.) Nini Apple will transfer from bed to chair and chair to bed with MODIFIED INDEPENDENCE using the least restrictive device within 7 treatment day(s).    (3.) Nini Apple will ambulate with MODIFIED INDEPENDENCE for 250 feet with the least restrictive device within 7 treatment day(s).   (4.) Nini Apple will perform standing static and dynamic balance activities x 10 minutes with MODIFIED INDEPENDENCE to improve safety within 7 treatment day(s).  (6.) Nini Apple will perform therapeutic exercises x 20 min for HEP/R TKA with INDEPENDENCE to improve strength, endurance, and functional mobility within 7 treatment day(s).       PHYSICAL THERAPY Initial Assessment, Daily Note, and AM  (Link to Caseload Tracking: PT Visit Days : 1  Acknowledge Orders  Time In/Out  PT Charge Capture  Rehab Caseload Tracker    Nini Apple is a 84 y.o. female   PRIMARY DIAGNOSIS: Community acquired pneumonia, unspecified laterality  Abdominal pain, unspecified abdominal location [R10.9]  Pneumonia of right lung due to infectious organism, unspecified part of lung [J18.9]  Community acquired pneumonia, unspecified laterality [J18.9]  Sepsis, due to unspecified organism, unspecified whether acute organ dysfunction present (HCC) [A41.9]  Nausea and vomiting, unspecified vomiting type [R11.2]       Reason for Referral: Generalized Muscle Weakness (M62.81)  Other abnormalities of gait and mobility (R26.89)  Inpatient: Payor: [facility] MEDICARE / Plan: [employer] DUAL COMPLETE / Product Type: *No Product type* /     ASSESSMENT:     REHAB RECOMMENDATIONS:   Recommendation to date pending progress:  Setting:  Outpatient Therapy    Equipment:    None     ASSESSMENT:  Ms. Apple is an 84 year old

## 2025-03-11 LAB
ALBUMIN SERPL-MCNC: 2.4 G/DL (ref 3.2–4.6)
ALBUMIN/GLOB SERPL: 0.9 (ref 1–1.9)
ALP SERPL-CCNC: 63 U/L (ref 35–104)
ALT SERPL-CCNC: 15 U/L (ref 8–45)
ANION GAP SERPL CALC-SCNC: 7 MMOL/L (ref 7–16)
AST SERPL-CCNC: 18 U/L (ref 15–37)
BASOPHILS # BLD: 0.05 K/UL (ref 0–0.2)
BASOPHILS NFR BLD: 0.5 % (ref 0–2)
BILIRUB SERPL-MCNC: <0.2 MG/DL (ref 0–1.2)
BUN SERPL-MCNC: 22 MG/DL (ref 8–23)
CALCIUM SERPL-MCNC: 9 MG/DL (ref 8.8–10.2)
CHLORIDE SERPL-SCNC: 108 MMOL/L (ref 98–107)
CO2 SERPL-SCNC: 25 MMOL/L (ref 20–29)
CREAT SERPL-MCNC: 0.91 MG/DL (ref 0.6–1.1)
DIFFERENTIAL METHOD BLD: ABNORMAL
EOSINOPHIL # BLD: 0.14 K/UL (ref 0–0.8)
EOSINOPHIL NFR BLD: 1.3 % (ref 0.5–7.8)
ERYTHROCYTE [DISTWIDTH] IN BLOOD BY AUTOMATED COUNT: 13.3 % (ref 11.9–14.6)
GLOBULIN SER CALC-MCNC: 2.6 G/DL (ref 2.3–3.5)
GLUCOSE SERPL-MCNC: 94 MG/DL (ref 70–99)
HCT VFR BLD AUTO: 32.4 % (ref 35.8–46.3)
HGB BLD-MCNC: 10.4 G/DL (ref 11.7–15.4)
IMM GRANULOCYTES # BLD AUTO: 0.03 K/UL (ref 0–0.5)
IMM GRANULOCYTES NFR BLD AUTO: 0.3 % (ref 0–5)
LYMPHOCYTES # BLD: 2.58 K/UL (ref 0.5–4.6)
LYMPHOCYTES NFR BLD: 24.2 % (ref 13–44)
MCH RBC QN AUTO: 31.1 PG (ref 26.1–32.9)
MCHC RBC AUTO-ENTMCNC: 32.1 G/DL (ref 31.4–35)
MCV RBC AUTO: 97 FL (ref 82–102)
MONOCYTES # BLD: 0.84 K/UL (ref 0.1–1.3)
MONOCYTES NFR BLD: 7.9 % (ref 4–12)
NEUTS SEG # BLD: 7.04 K/UL (ref 1.7–8.2)
NEUTS SEG NFR BLD: 65.8 % (ref 43–78)
NRBC # BLD: 0 K/UL (ref 0–0.2)
PLATELET # BLD AUTO: 187 K/UL (ref 150–450)
PMV BLD AUTO: 10.3 FL (ref 9.4–12.3)
POTASSIUM SERPL-SCNC: 3.6 MMOL/L (ref 3.5–5.1)
PROT SERPL-MCNC: 4.9 G/DL (ref 6.3–8.2)
RBC # BLD AUTO: 3.34 M/UL (ref 4.05–5.2)
SODIUM SERPL-SCNC: 141 MMOL/L (ref 136–145)
WBC # BLD AUTO: 10.7 K/UL (ref 4.3–11.1)

## 2025-03-11 PROCEDURE — 97535 SELF CARE MNGMENT TRAINING: CPT

## 2025-03-11 PROCEDURE — 80053 COMPREHEN METABOLIC PANEL: CPT

## 2025-03-11 PROCEDURE — 2500000003 HC RX 250 WO HCPCS: Performed by: HOSPITALIST

## 2025-03-11 PROCEDURE — 6370000000 HC RX 637 (ALT 250 FOR IP): Performed by: HOSPITALIST

## 2025-03-11 PROCEDURE — 6360000002 HC RX W HCPCS: Performed by: HOSPITALIST

## 2025-03-11 PROCEDURE — 6370000000 HC RX 637 (ALT 250 FOR IP): Performed by: INTERNAL MEDICINE

## 2025-03-11 PROCEDURE — 97530 THERAPEUTIC ACTIVITIES: CPT

## 2025-03-11 PROCEDURE — 85025 COMPLETE CBC W/AUTO DIFF WBC: CPT

## 2025-03-11 PROCEDURE — 36415 COLL VENOUS BLD VENIPUNCTURE: CPT

## 2025-03-11 PROCEDURE — 6370000000 HC RX 637 (ALT 250 FOR IP): Performed by: STUDENT IN AN ORGANIZED HEALTH CARE EDUCATION/TRAINING PROGRAM

## 2025-03-11 PROCEDURE — 1100000000 HC RM PRIVATE

## 2025-03-11 RX ORDER — LOPERAMIDE HYDROCHLORIDE 2 MG/1
4 CAPSULE ORAL 3 TIMES DAILY
Status: DISCONTINUED | OUTPATIENT
Start: 2025-03-11 | End: 2025-03-12 | Stop reason: HOSPADM

## 2025-03-11 RX ADMIN — SODIUM CHLORIDE, PRESERVATIVE FREE 5 ML: 5 INJECTION INTRAVENOUS at 20:28

## 2025-03-11 RX ADMIN — DOXYCYCLINE HYCLATE 100 MG: 100 CAPSULE ORAL at 09:04

## 2025-03-11 RX ADMIN — ENOXAPARIN SODIUM 40 MG: 100 INJECTION SUBCUTANEOUS at 09:04

## 2025-03-11 RX ADMIN — WATER 1000 MG: 1 INJECTION INTRAMUSCULAR; INTRAVENOUS; SUBCUTANEOUS at 00:57

## 2025-03-11 RX ADMIN — OXYCODONE AND ACETAMINOPHEN 1 TABLET: 10; 325 TABLET ORAL at 09:08

## 2025-03-11 RX ADMIN — LOPERAMIDE HYDROCHLORIDE 4 MG: 2 CAPSULE ORAL at 10:31

## 2025-03-11 RX ADMIN — PANTOPRAZOLE SODIUM 40 MG: 40 TABLET, DELAYED RELEASE ORAL at 05:20

## 2025-03-11 RX ADMIN — GABAPENTIN 300 MG: 300 CAPSULE ORAL at 09:04

## 2025-03-11 RX ADMIN — LOPERAMIDE HYDROCHLORIDE 4 MG: 2 CAPSULE ORAL at 20:27

## 2025-03-11 RX ADMIN — ASPIRIN 81 MG: 81 TABLET ORAL at 20:27

## 2025-03-11 RX ADMIN — LOPERAMIDE HYDROCHLORIDE 4 MG: 2 CAPSULE ORAL at 15:04

## 2025-03-11 RX ADMIN — ASPIRIN 81 MG: 81 TABLET ORAL at 09:04

## 2025-03-11 RX ADMIN — DOXYCYCLINE HYCLATE 100 MG: 100 CAPSULE ORAL at 20:27

## 2025-03-11 RX ADMIN — AMLODIPINE BESYLATE 5 MG: 5 TABLET ORAL at 09:04

## 2025-03-11 RX ADMIN — OXYCODONE AND ACETAMINOPHEN 1 TABLET: 10; 325 TABLET ORAL at 20:27

## 2025-03-11 RX ADMIN — SODIUM CHLORIDE, PRESERVATIVE FREE 10 ML: 5 INJECTION INTRAVENOUS at 09:09

## 2025-03-11 RX ADMIN — GABAPENTIN 300 MG: 300 CAPSULE ORAL at 20:27

## 2025-03-11 ASSESSMENT — PAIN SCALES - GENERAL
PAINLEVEL_OUTOF10: 8
PAINLEVEL_OUTOF10: 7
PAINLEVEL_OUTOF10: 0

## 2025-03-11 ASSESSMENT — PAIN DESCRIPTION - DESCRIPTORS
DESCRIPTORS: ACHING
DESCRIPTORS: DISCOMFORT

## 2025-03-11 ASSESSMENT — PAIN DESCRIPTION - ORIENTATION: ORIENTATION: RIGHT;LEFT

## 2025-03-11 ASSESSMENT — PAIN DESCRIPTION - FREQUENCY: FREQUENCY: INTERMITTENT

## 2025-03-11 ASSESSMENT — PAIN DESCRIPTION - ONSET: ONSET: ON-GOING

## 2025-03-11 ASSESSMENT — PAIN - FUNCTIONAL ASSESSMENT
PAIN_FUNCTIONAL_ASSESSMENT: PREVENTS OR INTERFERES SOME ACTIVE ACTIVITIES AND ADLS
PAIN_FUNCTIONAL_ASSESSMENT: ACTIVITIES ARE NOT PREVENTED

## 2025-03-11 ASSESSMENT — PAIN DESCRIPTION - PAIN TYPE: TYPE: CHRONIC PAIN

## 2025-03-11 ASSESSMENT — PAIN DESCRIPTION - LOCATION
LOCATION: FOOT
LOCATION: KNEE

## 2025-03-11 NOTE — PLAN OF CARE
Problem: Discharge Planning  Goal: Discharge to home or other facility with appropriate resources  Outcome: Progressing     Problem: Pain  Goal: Verbalizes/displays adequate comfort level or baseline comfort level  Outcome: Progressing     Problem: Safety - Adult  Goal: Free from fall injury  Outcome: Progressing     Problem: ABCDS Injury Assessment  Goal: Absence of physical injury  Outcome: Progressing     Problem: Respiratory - Adult  Goal: Achieves optimal ventilation and oxygenation  Outcome: Progressing     Problem: Skin/Tissue Integrity - Adult  Goal: Skin integrity remains intact  Outcome: Progressing  Goal: Oral mucous membranes remain intact  Outcome: Progressing     Problem: Musculoskeletal - Adult  Goal: Return mobility to safest level of function  Outcome: Progressing  Goal: Return ADL status to a safe level of function  Outcome: Progressing     Problem: Gastrointestinal - Adult  Goal: Minimal or absence of nausea and vomiting  Outcome: Progressing  Goal: Maintains or returns to baseline bowel function  Outcome: Progressing  Goal: Maintains adequate nutritional intake  Outcome: Progressing

## 2025-03-12 VITALS
WEIGHT: 155 LBS | BODY MASS INDEX: 22.96 KG/M2 | SYSTOLIC BLOOD PRESSURE: 132 MMHG | OXYGEN SATURATION: 95 % | DIASTOLIC BLOOD PRESSURE: 67 MMHG | HEART RATE: 74 BPM | TEMPERATURE: 98.4 F | HEIGHT: 69 IN | RESPIRATION RATE: 17 BRPM

## 2025-03-12 PROBLEM — T40.2X5A OPIOID-INDUCED CONSTIPATION: Chronic | Status: ACTIVE | Noted: 2025-03-09

## 2025-03-12 PROBLEM — R65.20 SEVERE SEPSIS: Status: RESOLVED | Noted: 2025-03-09 | Resolved: 2025-03-12

## 2025-03-12 PROBLEM — F11.90 CHRONIC, CONTINUOUS USE OF OPIOIDS: Chronic | Status: ACTIVE | Noted: 2025-03-09

## 2025-03-12 PROBLEM — I70.91 ATHEROSCLEROSIS, GENERALIZED: Chronic | Status: ACTIVE | Noted: 2025-03-09

## 2025-03-12 PROBLEM — K59.03 OPIOID-INDUCED CONSTIPATION: Chronic | Status: ACTIVE | Noted: 2025-03-09

## 2025-03-12 PROBLEM — A41.9 SEVERE SEPSIS: Status: RESOLVED | Noted: 2025-03-09 | Resolved: 2025-03-12

## 2025-03-12 PROBLEM — E87.6 HYPOKALEMIA: Status: RESOLVED | Noted: 2025-03-10 | Resolved: 2025-03-12

## 2025-03-12 LAB
ANION GAP SERPL CALC-SCNC: 9 MMOL/L (ref 7–16)
BUN SERPL-MCNC: 17 MG/DL (ref 8–23)
CALCIUM SERPL-MCNC: 9.1 MG/DL (ref 8.8–10.2)
CHLORIDE SERPL-SCNC: 108 MMOL/L (ref 98–107)
CO2 SERPL-SCNC: 25 MMOL/L (ref 20–29)
CREAT SERPL-MCNC: 0.87 MG/DL (ref 0.6–1.1)
ERYTHROCYTE [DISTWIDTH] IN BLOOD BY AUTOMATED COUNT: 13.3 % (ref 11.9–14.6)
GLUCOSE SERPL-MCNC: 101 MG/DL (ref 70–99)
HCT VFR BLD AUTO: 31.6 % (ref 35.8–46.3)
HGB BLD-MCNC: 10.6 G/DL (ref 11.7–15.4)
MCH RBC QN AUTO: 31.1 PG (ref 26.1–32.9)
MCHC RBC AUTO-ENTMCNC: 33.5 G/DL (ref 31.4–35)
MCV RBC AUTO: 92.7 FL (ref 82–102)
NRBC # BLD: 0 K/UL (ref 0–0.2)
PLATELET # BLD AUTO: 196 K/UL (ref 150–450)
PMV BLD AUTO: 9.7 FL (ref 9.4–12.3)
POTASSIUM SERPL-SCNC: 3.6 MMOL/L (ref 3.5–5.1)
RBC # BLD AUTO: 3.41 M/UL (ref 4.05–5.2)
SODIUM SERPL-SCNC: 142 MMOL/L (ref 136–145)
WBC # BLD AUTO: 6.8 K/UL (ref 4.3–11.1)

## 2025-03-12 PROCEDURE — 97530 THERAPEUTIC ACTIVITIES: CPT

## 2025-03-12 PROCEDURE — 36415 COLL VENOUS BLD VENIPUNCTURE: CPT

## 2025-03-12 PROCEDURE — 2500000003 HC RX 250 WO HCPCS: Performed by: HOSPITALIST

## 2025-03-12 PROCEDURE — 80048 BASIC METABOLIC PNL TOTAL CA: CPT

## 2025-03-12 PROCEDURE — 6370000000 HC RX 637 (ALT 250 FOR IP): Performed by: STUDENT IN AN ORGANIZED HEALTH CARE EDUCATION/TRAINING PROGRAM

## 2025-03-12 PROCEDURE — 6370000000 HC RX 637 (ALT 250 FOR IP): Performed by: HOSPITALIST

## 2025-03-12 PROCEDURE — 97110 THERAPEUTIC EXERCISES: CPT

## 2025-03-12 PROCEDURE — 6360000002 HC RX W HCPCS: Performed by: HOSPITALIST

## 2025-03-12 PROCEDURE — 97535 SELF CARE MNGMENT TRAINING: CPT

## 2025-03-12 PROCEDURE — 85027 COMPLETE CBC AUTOMATED: CPT

## 2025-03-12 PROCEDURE — 6370000000 HC RX 637 (ALT 250 FOR IP): Performed by: INTERNAL MEDICINE

## 2025-03-12 RX ORDER — LOPERAMIDE HYDROCHLORIDE 2 MG/1
2 CAPSULE ORAL 4 TIMES DAILY PRN
COMMUNITY
Start: 2025-03-12 | End: 2025-03-22

## 2025-03-12 RX ORDER — CEFDINIR 300 MG/1
300 CAPSULE ORAL 2 TIMES DAILY
Qty: 6 CAPSULE | Refills: 0 | Status: SHIPPED | OUTPATIENT
Start: 2025-03-12 | End: 2025-03-15

## 2025-03-12 RX ORDER — BISACODYL 5 MG/1
10 TABLET, DELAYED RELEASE ORAL DAILY PRN
COMMUNITY
Start: 2025-03-12

## 2025-03-12 RX ORDER — DOXYCYCLINE 100 MG/1
100 CAPSULE ORAL EVERY 12 HOURS SCHEDULED
Qty: 6 CAPSULE | Refills: 0 | Status: SHIPPED | OUTPATIENT
Start: 2025-03-12 | End: 2025-03-15

## 2025-03-12 RX ORDER — POLYETHYLENE GLYCOL 3350 17 G/17G
17 POWDER, FOR SOLUTION ORAL DAILY PRN
COMMUNITY
Start: 2025-03-12

## 2025-03-12 RX ADMIN — ASPIRIN 81 MG: 81 TABLET ORAL at 08:38

## 2025-03-12 RX ADMIN — GABAPENTIN 300 MG: 300 CAPSULE ORAL at 08:38

## 2025-03-12 RX ADMIN — SODIUM CHLORIDE, PRESERVATIVE FREE 10 ML: 5 INJECTION INTRAVENOUS at 08:39

## 2025-03-12 RX ADMIN — AMLODIPINE BESYLATE 5 MG: 5 TABLET ORAL at 08:38

## 2025-03-12 RX ADMIN — OXYCODONE AND ACETAMINOPHEN 1 TABLET: 10; 325 TABLET ORAL at 06:06

## 2025-03-12 RX ADMIN — ENOXAPARIN SODIUM 40 MG: 100 INJECTION SUBCUTANEOUS at 08:38

## 2025-03-12 RX ADMIN — LOPERAMIDE HYDROCHLORIDE 4 MG: 2 CAPSULE ORAL at 08:38

## 2025-03-12 RX ADMIN — WATER 1000 MG: 1 INJECTION INTRAMUSCULAR; INTRAVENOUS; SUBCUTANEOUS at 00:49

## 2025-03-12 RX ADMIN — PANTOPRAZOLE SODIUM 40 MG: 40 TABLET, DELAYED RELEASE ORAL at 06:02

## 2025-03-12 RX ADMIN — DOXYCYCLINE HYCLATE 100 MG: 100 CAPSULE ORAL at 08:37

## 2025-03-12 ASSESSMENT — PAIN DESCRIPTION - LOCATION: LOCATION: KNEE

## 2025-03-12 ASSESSMENT — PAIN - FUNCTIONAL ASSESSMENT: PAIN_FUNCTIONAL_ASSESSMENT: ACTIVITIES ARE NOT PREVENTED

## 2025-03-12 ASSESSMENT — PAIN SCALES - GENERAL
PAINLEVEL_OUTOF10: 8
PAINLEVEL_OUTOF10: 7

## 2025-03-12 ASSESSMENT — PAIN DESCRIPTION - ORIENTATION: ORIENTATION: RIGHT

## 2025-03-12 ASSESSMENT — PAIN DESCRIPTION - DESCRIPTORS: DESCRIPTORS: ACHING

## 2025-03-12 NOTE — DISCHARGE SUMMARY
Hospitalist Discharge Summary   Admit Date:  3/8/2025  9:43 PM   DC Note date: 3/12/2025  Name:  Nini Apple   Age:  84 y.o.  Sex:  female  :  1940   MRN:  [id number]   Room:  [facility]  PCP:  No primary care provider on file.    Presenting Complaint: Vomiting     Initial Admission Diagnosis: Abdominal pain, unspecified abdominal location [R10.9]  Pneumonia of right lung due to infectious organism, unspecified part of lung [J18.9]  Community acquired pneumonia, unspecified laterality [J18.9]  Sepsis, due to unspecified organism, unspecified whether acute organ dysfunction present (HCC) [A41.9]  Nausea and vomiting, unspecified vomiting type [R11.2]     Problem List for this Hospitalization (present on admission):    Principal Problem (Resolved):    Severe sepsis (HCC)  Active Problems:    HTN (hypertension)    Chronic back pain    GERD (gastroesophageal reflux disease)    Community acquired pneumonia, unspecified laterality    Opioid-induced constipation    Chronic, continuous use of opioids    Atherosclerosis, generalized  Resolved Problems:    Hypokalemia      Hospital Course:  Nini Apple is a 84 y.o. female with medical history of chronic back pain with chronic opioid use who presented with abdominal pain and nausea/vomiting.  Had constipation for the past 4 days.  No fever, chills, chest pain, or palpitations.  However, CT showed right middle lobe pneumonia.  Lactic acid 3.6.  WBC 13.3.  Admitted for pneumonia treatment.  Weaned off oxygen quickly but had some diarrhea (without abd pain, fever or other red flags) so miralax held and observed another day.  Labs stable and still on room air so stable enough for discharge today.  Pt feeling much better and diarrhea improved but may still need imodium.  3 more days abx for 7d total.    Disposition: Home  Diet: ADULT DIET; Easy to Chew  ADULT ORAL NUTRITION SUPPLEMENT; Breakfast, Lunch, Dinner; Standard High Calorie/High Protein Oral Supplement  Code

## 2025-03-12 NOTE — PROGRESS NOTES
Hospitalist Progress Note   Admit Date:  3/8/2025  9:43 PM   Name:  Nini Apple   Age:  84 y.o.  Sex:  female  :  1940   MRN:  [id number]   Room:  [facility]/    Presenting/Chief Complaint: Vomiting     Reason(s) for Admission: Abdominal pain, unspecified abdominal location [R10.9]  Pneumonia of right lung due to infectious organism, unspecified part of lung [J18.9]  Community acquired pneumonia, unspecified laterality [J18.9]  Sepsis, due to unspecified organism, unspecified whether acute organ dysfunction present (HCC) [A41.9]  Nausea and vomiting, unspecified vomiting type [R11.2]     Hospital Course:   Nini Apple is a 84 y.o. female with medical history of chronic back pain with chronic opioid use who presented with abdominal pain and nausea/vomiting.  Had constipation for the past 4 days.  No fever, chills, chest pain, or palpitations.  However, CT showed right middle lobe pneumonia.  Lactic acid 3.6.  WBC 13.3.  Admitted for pneumonia treatment.      Subjective & 24hr Events:   No acute overnight events.    Had BM this morning.  However still having nausea.  Had nonbloody emesis this morning after BM.  No fever or chills.  No coughing, phlegm, or chest pain.  She has rhinorrhea, but she has allergies.      Assessment & Plan:       Severe sepsis  -Met SIRS criteria with tachycardia and leukocytosis  -In the setting of pneumonia  -Lactic acid 3.6 on admission  -S/p 30 mL/kg bolus +1 L of NS  -Follow up with blood culture results.  -Lactic acid trending up.  Will order another fluid      Community-acquired pneumonia  -Noted in CT, with airspace opacity in the right middle lobe  -Continue ceftriaxone and doxycycline      Chronic back pain  -Continue pain medication: Percocet and Neurontin  -Will order PT OT      Chronic opioid use  Opioid induced constipation  -She has been having narcotics for the past 8 years  -Continue Linzess  -She had BM this morning.  Will change Dulcolax from daily dosing to 
       Hospitalist Progress Note   Admit Date:  3/8/2025  9:43 PM   Name:  Nini Apple   Age:  84 y.o.  Sex:  female  :  1940   MRN:  [id number]   Room:  3/    Presenting/Chief Complaint: Vomiting     Reason(s) for Admission: Abdominal pain, unspecified abdominal location [R10.9]  Pneumonia of right lung due to infectious organism, unspecified part of lung [J18.9]  Community acquired pneumonia, unspecified laterality [J18.9]  Sepsis, due to unspecified organism, unspecified whether acute organ dysfunction present (HCC) [A41.9]  Nausea and vomiting, unspecified vomiting type [R11.2]     Hospital Course:   Nini Apple is a 84 y.o. female with medical history of chronic back pain with chronic opioid use who presented with abdominal pain and nausea/vomiting.  Had constipation for the past 4 days.  No fever, chills, chest pain, or palpitations.  However, CT showed right middle lobe pneumonia.  Lactic acid 3.6.  WBC 13.3.  Admitted for pneumonia treatment.      Subjective & 24hr Events:   Some diarrhea since yesterday.  Loose brown.  No abd pain, fevers, n/v.  She says she has \"never\" had abx before so unclear if this is an adverse reaction to one.  No sick contacts.  No CP, SOB or other complaints.      Assessment & Plan:       Severe sepsis -resolved  -resolved.   -WBC normalized today; recheck tomorrow    Diarrhea  -start imodium   -stop miralax    Community-acquired pneumonia  -Noted in CT, with airspace opacity in the right middle lobe  -Continue ceftriaxone and doxycycline    Chronic back pain  -Continue pain medication: Percocet and Neurontin  -Continue PT OT    Chronic opioid use  Opioid induced constipation  -She has been having narcotics for the past 8 years  -monitor closely as dont want constipation to develop with imodium    Essential hypertension  -Continue amlodipine    Atherosclerosis of intracranial arteries  -Continue aspirin    GERD  -Continue PPI    Hypokalemia  -resolved.  -recheck 
ACUTE OCCUPATIONAL THERAPY GOALS:   (Developed with and agreed upon by patient and/or caregiver.)    1. Pt will complete LB ADL I with AE as needed.   2. Pt will complete toileting I with AE as needed.   3. Pt will complete UB ADL I.  4. Pt will tolerate 25 minutes of OT treatment requiring 1-2 breaks as needed.   5. Pt will complete grooming tasks while standing at sink Mod I cane.  6. Pt will complete functional mobility via cane Mod I.   7. Pt will tolerate BUE exercises to increase endurance for safe, functional transfers and/or functional mobility, and ADL participation.      Timeframe: 7 visits  OCCUPATIONAL THERAPY: Daily Note AM   OT Visit Days: 2   Time In/Out  OT Charge Capture  Rehab Caseload Tracker  OT Orders    Nini Apple is a 84 y.o. female   PRIMARY DIAGNOSIS: Community acquired pneumonia, unspecified laterality  Abdominal pain, unspecified abdominal location [R10.9]  Pneumonia of right lung due to infectious organism, unspecified part of lung [J18.9]  Community acquired pneumonia, unspecified laterality [J18.9]  Sepsis, due to unspecified organism, unspecified whether acute organ dysfunction present (HCC) [A41.9]  Nausea and vomiting, unspecified vomiting type [R11.2]       Inpatient: Payor: [facility] MEDICARE / Plan: [employer] DUAL COMPLETE / Product Type: *No Product type* /     ASSESSMENT:     REHAB RECOMMENDATIONS:   Recommendation to date pending progress:  Setting:  No further skilled occupational therapy after discharge from hospital    Equipment:    None     ASSESSMENT:  Ms. Apple is doing great with mobility and self care activities. Pt only required supervision with mobility and self care activities standing at the sink. Pt is likely close to her baseline. Will follow.       SUBJECTIVE:     Ms. Apple states, \"I can wash up a little bit.\"     Social/Functional Lives With: Alone  Type of Home: House  Home Layout: One level  Home Access: Ramped entrance  Bathroom Shower/Tub: 
ACUTE OCCUPATIONAL THERAPY GOALS:   (Developed with and agreed upon by patient and/or caregiver.)    1. Pt will complete LB ADL I with AE as needed.   2. Pt will complete toileting I with AE as needed.   3. Pt will complete UB ADL I.  4. Pt will tolerate 25 minutes of OT treatment requiring 1-2 breaks as needed.   5. Pt will complete grooming tasks while standing at sink Mod I cane.  6. Pt will complete functional mobility via cane Mod I.   7. Pt will tolerate BUE exercises to increase endurance for safe, functional transfers and/or functional mobility, and ADL participation.      Timeframe: 7 visits  OCCUPATIONAL THERAPY: Daily Note AM   OT Visit Days: 3   Time In/Out  OT Charge Capture  Rehab Caseload Tracker  OT Orders    Nini Apple is a 84 y.o. female   PRIMARY DIAGNOSIS: Severe sepsis (HCC)  Abdominal pain, unspecified abdominal location [R10.9]  Pneumonia of right lung due to infectious organism, unspecified part of lung [J18.9]  Community acquired pneumonia, unspecified laterality [J18.9]  Sepsis, due to unspecified organism, unspecified whether acute organ dysfunction present (HCC) [A41.9]  Nausea and vomiting, unspecified vomiting type [R11.2]       Inpatient: Payor: [facility] MEDICARE / Plan: [employer] DUAL COMPLETE / Product Type: *No Product type* /     ASSESSMENT:     REHAB RECOMMENDATIONS:   Recommendation to date pending progress:  Setting:  No further skilled occupational therapy after discharge from hospital    Equipment:    None     ASSESSMENT:  Ms. Apple is doing well today. Pt presents supine upon arrival. Pt able to perform bed/functional/bathroom mobility with supervision today. Completed toileting Mod I. Pt later instructed in UE exercises and issued HEP. Tolerated exercises well and verbalized understanding of performing them daily. Pt transferred to wheelchair to be discharged.       SUBJECTIVE:     Ms. Apple states, \"I be in the flower garden\"     Social/Functional Lives With: 
ACUTE OCCUPATIONAL THERAPY GOALS:   (Developed with and agreed upon by patient and/or caregiver.)  1. Pt will complete LB ADL I with AE as needed.   2. Pt will complete toileting I with AE as needed.   3. Pt will complete UB ADL I.  4. Pt will tolerate 25 minutes of OT treatment requiring 1-2 breaks as needed.   5. Pt will complete grooming tasks while standing at sink Mod I cane.  6. Pt will complete functional mobility via cane Mod I.   7. Pt will tolerate BUE exercises to increase endurance for safe, functional transfers and/or functional mobility, and ADL participation.     Timeframe: 7 visits      OCCUPATIONAL THERAPY Initial Assessment, Daily Note, and AM       OT Visit Days: 1  Acknowledge Orders  Time  OT Charge Capture  Rehab Caseload Tracker      Nini Apple is a 84 y.o. female   PRIMARY DIAGNOSIS: Community acquired pneumonia, unspecified laterality  Abdominal pain, unspecified abdominal location [R10.9]  Pneumonia of right lung due to infectious organism, unspecified part of lung [J18.9]  Community acquired pneumonia, unspecified laterality [J18.9]  Sepsis, due to unspecified organism, unspecified whether acute organ dysfunction present (HCC) [A41.9]  Nausea and vomiting, unspecified vomiting type [R11.2]       Reason for Referral: Generalized Muscle Weakness (M62.81)  Inpatient: Payor: [facility] MEDICARE / Plan: [employer] DUAL COMPLETE / Product Type: *No Product type* /     ASSESSMENT:     REHAB RECOMMENDATIONS:   Recommendation to date pending progress:  Setting:  No further skilled occupational therapy after discharge from hospital    Equipment:    None     ASSESSMENT:  Ms. Apple has a significant hx of chronic back pain, HTN, recent R TKA (January 2025). Pt was admitted for CAP. At functional baseline, pt has been Mod I via cane for ~ the past two weeks for functional mobility and A/IADLs.    Pt was received supine in bed with PCT present. Pt required assistance for functional mobility and 
ACUTE PHYSICAL THERAPY GOALS:   (Developed with and agreed upon by patient and/or caregiver.)  (1.) Nini Apple  will move from supine to sit and sit to supine  and scoot up and down with INDEPENDENCE within 7 treatment day(s).  GOAL MET: 3/11/25  (2.) Nini Apple will transfer from bed to chair and chair to bed with MODIFIED INDEPENDENCE using the least restrictive device within 7 treatment day(s).  GOAL MET: 3/11/25  (3.) Nini Apple will ambulate with MODIFIED INDEPENDENCE for 250 feet with the least restrictive device within 7 treatment day(s).   (4.) Nini Apple will perform standing static and dynamic balance activities x 10 minutes with MODIFIED INDEPENDENCE to improve safety within 7 treatment day(s). GOAL MET: 3/11/25  (6.) Nini Apple will perform therapeutic exercises x 20 min for HEP/R TKA with INDEPENDENCE to improve strength, endurance, and functional mobility within 7 treatment day(s).           PHYSICAL THERAPY: Daily Note AM   (Link to Caseload Tracking: PT Visit Days : 2  Time In/Out PT Charge Capture  Rehab Caseload Tracker  Orders    Nini Apple is a 84 y.o. female   PRIMARY DIAGNOSIS: Community acquired pneumonia, unspecified laterality  Abdominal pain, unspecified abdominal location [R10.9]  Pneumonia of right lung due to infectious organism, unspecified part of lung [J18.9]  Community acquired pneumonia, unspecified laterality [J18.9]  Sepsis, due to unspecified organism, unspecified whether acute organ dysfunction present (HCC) [A41.9]  Nausea and vomiting, unspecified vomiting type [R11.2]       Inpatient: Payor: [facility] MEDICARE / Plan: [employer] DUAL COMPLETE / Product Type: *No Product type* /     ASSESSMENT:     REHAB RECOMMENDATIONS:   Recommendation to date pending progress:  Setting:  Outpatient Therapy    Equipment:    None     ASSESSMENT:  Ms. Apple is supine upon contact and agreeable to mobility with encouragement.  The patient performed bed mobility, 
ACUTE PHYSICAL THERAPY GOALS:   (Developed with and agreed upon by patient and/or caregiver.)  (1.) Nini Apple  will move from supine to sit and sit to supine  and scoot up and down with INDEPENDENCE within 7 treatment day(s).  GOAL MET: 3/11/25  (2.) Nini Apple will transfer from bed to chair and chair to bed with MODIFIED INDEPENDENCE using the least restrictive device within 7 treatment day(s).  GOAL MET: 3/11/25  (3.) Nini Apple will ambulate with MODIFIED INDEPENDENCE for 250 feet with the least restrictive device within 7 treatment day(s). GOAL MET: 3/12  (4.) Nini Apple will perform standing static and dynamic balance activities x 10 minutes with MODIFIED INDEPENDENCE to improve safety within 7 treatment day(s). GOAL MET: 3/11/25  (6.) Nini Apple will perform therapeutic exercises x 20 min for HEP/R TKA with INDEPENDENCE to improve strength, endurance, and functional mobility within 7 treatment day(s).           PHYSICAL THERAPY: Daily Note AM   (Link to Caseload Tracking: PT Visit Days : 3  Time In/Out PT Charge Capture  Rehab Caseload Tracker  Orders    Nini Apple is a 84 y.o. female   PRIMARY DIAGNOSIS: Severe sepsis (HCC)  Abdominal pain, unspecified abdominal location [R10.9]  Pneumonia of right lung due to infectious organism, unspecified part of lung [J18.9]  Community acquired pneumonia, unspecified laterality [J18.9]  Sepsis, due to unspecified organism, unspecified whether acute organ dysfunction present (HCC) [A41.9]  Nausea and vomiting, unspecified vomiting type [R11.2]       Inpatient: Payor: [facility] MEDICARE / Plan: [employer] DUAL COMPLETE / Product Type: *No Product type* /     ASSESSMENT:     REHAB RECOMMENDATIONS:   Recommendation to date pending progress:  Setting:  Outpatient Therapy    Equipment:    None     ASSESSMENT:  Ms. Apple is making good progress toward goals with increased independence levels. She was in the recliner upon contact and agreeable to 
Nutrition Assessment  Assessment Type: Initial, Positive nutrition screen  Reason for visit:  Best Practice Alert: Malnutrition Screening Tool   Malnutrition Screening Tool Score: 3    Nutrition Intervention:   Food and/or Nutrient Delivery:   Meals and Snacks:  Diet: Continue current order  Medical Food Supplements:   Medical food supplement therapy:  Continue Ensure Plus High Protein (high calorie high protein) 350 calories, 20 grams protein per 8 ounce serving      Malnutrition Assessment:  [facility]S.P[facility]N Clinical Malnutrition Criteria  Malnutrition Status: At risk for malnutrition (chronic poor po pta with acute decline this admission, possible wt loss)    Nutrition Focused Physical Exam: Unremarkable     Nutrition Assessment:  Food/Nutrition Related History: Pt reports chronic poor intake since her knee replacement in Jan 2025. She stated knee pain and back pain are her limiting her intake. She stated she usually eats a breakfast and supper. Pt stated she usually has a sandwich or a snack at lunch time. She stated she is drinking ~2 Ensures per day at home.      Do You Have Any Cultural, Jainism, or Ethnic Food Preferences?: No   Weight History: Pt is unsure of any recent wt changes. She stated she weighed ~150lb 3 months ago. Per EMR wt hx review 3/4/24 158lb (pain management), 6/12 158lb (pain management).   Nutrition Background:       PMH significant for HTN, GERD, peripheral neuropathy, and chronic back pain. Pt admitted with severe sepsis, community acquired pneumonia, and opioid induced constipation.   Nutrition Monitoring/Evaluation:  Pt seen reclined in bed. She stated she did not have her teeth earlier this admission so she was not able to eat. She stated her daughter brought her teeth last night. She reports she ate a little breakfast and lunch today. RD observed most of mashed potatoes and all of Ensure consumed on lunch tray. Pt stated now she is afraid to eat due to diarrhea. She stated she is 
Outpatient Pharmacy Progress Note for Meds-to-Beds    Total number of Prescriptions Filled: 2    List of Medications (name,strength):  cefdinir (OMNICEF) 300mg  doxycycline hyclate 100mg      Delivered to: keira torre      Co-pay: 0      Payment Type: 0      Additional Documentation:  Medication(s) were delivered to the patient's room prior to discharge      Thank you for letting us serve your patients.  [facility] [id number]- [place] [address]  Phone: [phone]  Fax: [phone]       
Physical Therapy Note:    Attempted to see patient this PM for physical therapy evaluation session. Patient politely declined stating she is fatigued. Will follow and re-attempt as schedule permits/patient available. Thank you,    SHIRA DICKERSON, PT     Rehab Caseload Tracker    
TRANSFER - OUT REPORT:    Verbal report given to ALLA Stiles on Nini Apple  being transferred to [facility]  for routine progression of patient care       Report consisted of patient's Situation, Background, Assessment and   Recommendations(SBAR).     Information from the following report(s) Nurse Handoff Report was reviewed with the receiving nurse.      Opportunity for questions and clarification was provided.             
 No jugular venous distension.  Lungs:   CTA bilaterally.  No wheezing, rhonchi, or rales.  Symmetric expansion.  Abdomen:   Soft, nontender, nondistended.  Extremities: No cyanosis or clubbing.  No edema  Skin:     No rashes.  Normal coloration.   Warm and dry.    Neuro:  CN II-XII grossly intact.    Psych:  Normal mood and affect.      I have personally reviewed labs and tests:  Recent Labs:  Recent Results (from the past 48 hours)   CBC with Auto Differential    Collection Time: 03/08/25  9:47 PM   Result Value Ref Range    WBC 13.3 (H) 4.3 - 11.1 K/uL    RBC 4.44 4.05 - 5.2 M/uL    Hemoglobin 13.8 11.7 - 15.4 g/dL    Hematocrit 40.1 35.8 - 46.3 %    MCV 90.3 82 - 102 FL    MCH 31.1 26.1 - 32.9 PG    MCHC 34.4 31.4 - 35.0 g/dL    RDW 13.2 11.9 - 14.6 %    Platelets 240 150 - 450 K/uL    MPV 9.5 9.4 - 12.3 FL    nRBC 0.00 0.0 - 0.2 K/uL    Differential Type AUTOMATED      Neutrophils % 72.8 43.0 - 78.0 %    Lymphocytes % 20.5 13.0 - 44.0 %    Monocytes % 5.9 4.0 - 12.0 %    Eosinophils % 0.2 (L) 0.5 - 7.8 %    Basophils % 0.2 0.0 - 2.0 %    Immature Granulocytes % 0.4 0.0 - 5.0 %    Neutrophils Absolute 9.68 (H) 1.70 - 8.20 K/UL    Lymphocytes Absolute 2.72 0.50 - 4.60 K/UL    Monocytes Absolute 0.79 0.10 - 1.30 K/UL    Eosinophils Absolute 0.03 0.00 - 0.80 K/UL    Basophils Absolute 0.03 0.00 - 0.20 K/UL    Immature Granulocytes Absolute 0.05 0.0 - 0.5 K/UL   Comprehensive Metabolic Panel    Collection Time: 03/08/25  9:47 PM   Result Value Ref Range    Sodium 138 136 - 145 mmol/L    Potassium 3.3 (L) 3.5 - 5.1 mmol/L    Chloride 103 98 - 107 mmol/L    CO2 20 20 - 29 mmol/L    Anion Gap 16 7 - 16 mmol/L    Glucose 138 (H) 70 - 99 mg/dL    BUN 11 8 - 23 MG/DL    Creatinine 1.02 0.60 - 1.10 MG/DL    Est, Glom Filt Rate 54 (L) >60 ml/min/1.73m2    Calcium 10.3 (H) 8.8 - 10.2 MG/DL    Total Bilirubin 0.6 0.0 - 1.2 MG/DL    ALT 32 8 - 45 U/L    AST 29 15 - 37 U/L    Alk Phosphatase 97 35 - 104 U/L    Total Protein 6.7

## 2025-03-12 NOTE — CARE COORDINATION
Pt is medically cleared for dc to home today and will resume outpatient PT at [facility].  No other dc needs or concerns identified or reported.  CM remains available to assist as needed.       03/10/25 [id number]   Service Assessment   Patient Orientation Alert and Oriented   Cognition Alert   History Provided By Patient;Medical Record   Primary Caregiver Self   Accompanied By/Relationship n/a   Support Systems Children;Family Members   Patient's Healthcare Decision Maker is: Legal Next of Kin   PCP Verified by CM Yes  (PA at [facility]; pt requesting to change to a [facility] PCP so she can be seen by a MD, not midlevel provider.  Referral sent.)   Last Visit to PCP Within last 3 months   Prior Functional Level Independent in ADLs/IADLs   Current Functional Level Independent in ADLs/IADLs;Assistance with the following:;Mobility   Can patient return to prior living arrangement Yes   Ability to make needs known: Good   Family able to assist with home care needs: Yes   Would you like for me to discuss the discharge plan with any other family members/significant others, and if so, who? Yes  (Dtr/Taina Castrejon)   Financial Resources Medicare;Medicaid   Community Resources Other (Comment)  ( Outpatient Rehab Services)   Social/Functional History   Lives With Alone   Type of Home House   Home Layout One level   Home Access Ramped entrance   Bathroom Shower/Tub Tub/Shower unit   Bathroom Equipment Shower chair   Home Equipment Cane;Walker - Rolling   Receives Help From Outpatient therapy   Prior Level of Assist for ADLs Independent   Prior Level of Assist for Homemaking Independent   Ambulation Assistance Independent   Prior Level of Assist for Transfers Independent   Occupation Retired   Discharge Planning   Type of Residence House   Living Arrangements Alone   Current Services Prior To Admission Other (Comment)  (Outpatient PT)   Potential Assistance Needed Outpatient PT/OT   DME Ordered? No

## 2025-03-13 ENCOUNTER — CARE COORDINATION (OUTPATIENT)
Dept: CARE COORDINATION | Facility: CLINIC | Age: 85
End: 2025-03-13

## 2025-03-13 LAB — MAGNESIUM SERPL-MCNC: 1.8 MG/DL (ref 1.8–2.4)

## 2025-03-13 NOTE — CARE COORDINATION
Care Transitions Note    Initial Call - Call within 2 business days of discharge: Yes    Patient Current Location:  Home: [address] [place] [id number]    Care Transition Nurse contacted the patient, family, daughter Nini  by telephone to perform post hospital discharge assessment, verified name and  as identifiers. Provided introduction to self, and explanation of the Care Transition Nurse role.     Patient: Nini Apple    Patient : 1940   MRN: [id number]    Reason for Admission: Sepsis  Discharge Date: 3/12/25  RURS: Readmission Risk Score: 10.6      Last Discharge Facility       Date Complaint Diagnosis Description Type Department Provider    3/8/25 Vomiting Nausea and vomiting, unspecified vomiting type ... ED to Hosp-Admission (Discharged) (ADMITTED) [facility] Rodrigo Mancilla MD; Octavio, ...            Was this an external facility discharge? No    Additional needs identified to be addressed with provider   No needs identified             Method of communication with provider: none.    Patients top risk factors for readmission: medical condition-sepsis,HTN    Interventions to address risk factors:   Reviewed discharge instructions and offered opportunity to ask any questions regarding discharge instructions.  Confirmed medications obtained and taking as ordered  Patient in need of a new PCP. CTN contacted Primary Care DT to request a hospital follow up appointment and an appointment for new patient with a PCP.   Hospital F/u scheduled for 3/18/2025 at 1:00 pm  New patient appointment with Dr. April Alvarado-4/3/2025 at 1:00 pm.  Provided all contact information for [facility] PCP DT to daughter.   Patient being followed by [facility] OT for PT. Patient had Right TKA on 2025. Scheduled to resume therapy tomorrow. Patient canceled and will reschedule when she is feeling better.   Patient/Family to contact CTN with any issues, questions, or concerns prior to next outreach.    Care Transition

## 2025-03-14 ENCOUNTER — APPOINTMENT (OUTPATIENT)
Dept: PHYSICAL THERAPY | Age: 85
End: 2025-03-14
Attending: ORTHOPAEDIC SURGERY
Payer: MEDICARE

## 2025-03-14 LAB
BACTERIA SPEC CULT: NORMAL
BACTERIA SPEC CULT: NORMAL
SERVICE CMNT-IMP: NORMAL
SERVICE CMNT-IMP: NORMAL

## 2025-03-18 ENCOUNTER — OFFICE VISIT (OUTPATIENT)
Dept: FAMILY MEDICINE CLINIC | Facility: CLINIC | Age: 85
End: 2025-03-18

## 2025-03-18 VITALS
HEIGHT: 69 IN | HEART RATE: 93 BPM | BODY MASS INDEX: 21.77 KG/M2 | RESPIRATION RATE: 16 BRPM | WEIGHT: 147 LBS | TEMPERATURE: 98 F | OXYGEN SATURATION: 99 % | DIASTOLIC BLOOD PRESSURE: 80 MMHG | SYSTOLIC BLOOD PRESSURE: 142 MMHG

## 2025-03-18 DIAGNOSIS — S61.215A LACERATION OF LEFT RING FINGER WITHOUT FOREIGN BODY WITHOUT DAMAGE TO NAIL, INITIAL ENCOUNTER: ICD-10-CM

## 2025-03-18 DIAGNOSIS — R11.0 NAUSEA: ICD-10-CM

## 2025-03-18 DIAGNOSIS — K21.9 GASTROESOPHAGEAL REFLUX DISEASE, UNSPECIFIED WHETHER ESOPHAGITIS PRESENT: Chronic | ICD-10-CM

## 2025-03-18 DIAGNOSIS — Z09 HOSPITAL DISCHARGE FOLLOW-UP: ICD-10-CM

## 2025-03-18 DIAGNOSIS — K21.9 GASTROESOPHAGEAL REFLUX DISEASE, UNSPECIFIED WHETHER ESOPHAGITIS PRESENT: Primary | Chronic | ICD-10-CM

## 2025-03-18 DIAGNOSIS — I10 HYPERTENSION, UNSPECIFIED TYPE: Chronic | ICD-10-CM

## 2025-03-18 PROBLEM — J18.9 COMMUNITY ACQUIRED PNEUMONIA, UNSPECIFIED LATERALITY: Status: RESOLVED | Noted: 2025-03-09 | Resolved: 2025-03-18

## 2025-03-18 PROBLEM — D64.9 NORMOCYTIC ANEMIA: Status: ACTIVE | Noted: 2025-03-18

## 2025-03-18 RX ORDER — FAMOTIDINE 20 MG/1
20 TABLET, FILM COATED ORAL EVERY EVENING
Qty: 30 TABLET | Refills: 0 | Status: SHIPPED | OUTPATIENT
Start: 2025-03-18 | End: 2025-03-19

## 2025-03-18 ASSESSMENT — PATIENT HEALTH QUESTIONNAIRE - PHQ9
SUM OF ALL RESPONSES TO PHQ QUESTIONS 1-9: 0
SUM OF ALL RESPONSES TO PHQ QUESTIONS 1-9: 0
2. FEELING DOWN, DEPRESSED OR HOPELESS: NOT AT ALL
1. LITTLE INTEREST OR PLEASURE IN DOING THINGS: NOT AT ALL
SUM OF ALL RESPONSES TO PHQ QUESTIONS 1-9: 0
SUM OF ALL RESPONSES TO PHQ QUESTIONS 1-9: 0

## 2025-03-18 NOTE — PROGRESS NOTES
including small meals, avoid spicy or acidic food, avoid lying down for 2 hours after eating, etc   Pt advised to return to clinic or seek medical care elsewhere if symptoms worsen, change, or fail to improve.    Shallow laceration to L 4th digit   Wound approximated with steri-strips. Keep skin dry for one week, then may let water run over it and let steri-strips fall off with time.  Pt to monitor for any signs or symptoms of infection and RTC if they occur  Pt advised to get tetanus booster at her pharmacy  HTN-- BP above goal. Noncompliant on meds. Discussed need for taking med every day. Recheck at next visit on 4/3/25    [facility]/20[facility] with Rylie Alvarado NP  Follow-up and Dispositions    Return in 2 weeks (on 4/1/2025).         JUNO Nice    
every 6 hours as needed for Nausea or Vomiting 30 tablet 0    aspirin 81 MG EC tablet Take 1 tablet by mouth 2 times daily 60 tablet 0    linaclotide (LINZESS) 145 MCG capsule Take 1 capsule by mouth every morning (before breakfast)      amitriptyline (ELAVIL) 25 MG tablet Take 2 tablets by mouth nightly      melatonin 5 MG TBDP disintegrating tablet Take 1 tablet by mouth nightly      amLODIPine (NORVASC) 2.5 MG tablet Take 2 tablets by mouth daily      gabapentin (NEURONTIN) 300 MG capsule Take 1 capsule by mouth 2 times daily. Patient states she only takes PRN      omeprazole (PRILOSEC) 20 MG delayed release capsule Take 2 capsules by mouth daily          Medications patient taking as of now reconciled against medications ordered at time of hospital discharge: Yes    Review of Systems    Objective:    BP (!) 142/80   Pulse 93   Temp 98 °F (36.7 °C) (Oral)   Resp 16   Ht 1.753 m (5' 9\")   Wt 66.7 kg (147 lb)   SpO2 99%   BMI 21.71 kg/m²   Physical Exam    An electronic signature was used to authenticate this note.  --JUNO Nice

## 2025-03-19 RX ORDER — FAMOTIDINE 20 MG/1
20 TABLET, FILM COATED ORAL EVERY EVENING
Qty: 90 TABLET | Refills: 0 | Status: SHIPPED | OUTPATIENT
Start: 2025-03-19

## 2025-03-20 ENCOUNTER — CARE COORDINATION (OUTPATIENT)
Dept: CARE COORDINATION | Facility: CLINIC | Age: 85
End: 2025-03-20

## 2025-03-20 NOTE — CARE COORDINATION
Care Transitions Note    Follow Up Call     Patient Current Location:  Home: [address] [place] [id number]    LPN Care Coordinator contacted the patient by telephone. Verified name and  as identifiers.    Additional needs identified to be addressed with provider   No needs identified                 Method of communication with provider: none.    Care Summary Note: Patient reports doing well.  She was seen for hospital f/u on 3/18/25 and was referred to gastro which is scheduled 25 9am for evaluation of nausea and reflux.  She is currently taking omeprazole 40mg but reports little improvement. Pepcid 20mg every evening added.  She reports no constipation. Patient has a large supportive family.  Her daughter, Nini is staying with her currently while her grand daughter is out of town. Patient reports he knee is healing well. She is a lifelong caregiver and is excited to get back to helping her neighbors as much as possible.   No questions or concerns voiced today.    Advance Care Planning:   Does patient have an Advance Directive: reviewed during previous call, see note.     Medication Review:  Medications changed since last call, reviewed today.       Assessments:   Goals Addressed                   This Visit's Progress     Returns to baseline activity level.   On track     Patient/Family able to obtain medicine after d/c     Patient/Family able to verbalize medicine changes     Patient/Family aware and attends follow up appointments s/p d/c.     Patient/Family agrees to notify provider of any barriers to plan of care.    Patient/Family agrees to notify provider of any symptoms that indicate a worsening of condition                 Follow Up Appointment:   Reviewed upcoming appointment(s).  Future Appointments         Provider Specialty Dept Phone    2025 9:00 AM Viola Rico PA-C Gastroenterology [phone]    4/3/2025 1:00 PM April Alvarado, APRN - CNP Family Medicine [phone]

## 2025-03-28 ENCOUNTER — CARE COORDINATION (OUTPATIENT)
Dept: CARE COORDINATION | Facility: CLINIC | Age: 85
End: 2025-03-28

## 2025-03-28 NOTE — CARE COORDINATION
Care Transitions Note    Follow Up Call     Patient Current Location:  Home: [address] [place] [id number]    LPN Care Coordinator contacted the patient by telephone. Verified name and  as identifiers.    Additional needs identified to be addressed with provider   No needs identified                 Method of communication with provider: none.    Care Summary Note: Patient reports doing fine today.  No changes noted or reported since our last conversation on 3/20/25.  Patient verbalized no questions or concerns today.        Advance Care Planning:   Does patient have an Advance Directive: reviewed during previous call, see note. .    Medication Review:  No changes since last call.     Assessments:   Goals Addressed                   This Visit's Progress     Returns to baseline activity level.   On track     Patient/Family able to obtain medicine after d/c     Patient/Family able to verbalize medicine changes     Patient/Family aware and attends follow up appointments s/p d/c.     Patient/Family agrees to notify provider of any barriers to plan of care.    Patient/Family agrees to notify provider of any symptoms that indicate a worsening of condition                 Follow Up Appointment:   Reviewed upcoming appointment(s).  Future Appointments         Provider Specialty Dept Phone    2025 9:00 AM Viola Rico PA-C Gastroenterology [phone]    4/3/2025 1:00 PM April Alvarado APRN - CNP Family Medicine [phone]    2025 9:35 AM Rodrigo Aguilar MD Orthopedic Surgery [phone]            LPN Care Coordinator provided contact information.  Plan for follow-up call in 6-10 days based on severity of symptoms and risk factors.  Plan for next call: symptom management    Raven Potts LPN

## 2025-04-01 NOTE — PROGRESS NOTES
GASTROENTEROLOGY CLINIC VISIT    CC: GERD, nausea, diarrhea    HPI  Nini Apple is a 84 y.o. year old female, new patient, who presents to the clinic today for evaluation of GERD, nausea, diarrhea. PMH pertinent for GERD, diverticulitis, chronic nausea. Pt was referred by transition of care provider. Referral note from 3/18/25 reviewed. Pt reports previously seen by [facility]. She was recently admitted to the hospital 3/8/25-3/12/25. She presented to ED that day with GI complaints but was found to have pneumonia. She reports she experienced watery stool while in hospital. Reports stool is now loose but formed, and she is having 1 BM/day. Denies melena, hematochezia. Currently taking linzess, miralax prn. She complains of heartburn and nausea which she experiences when she takes pills on an empty stomach as well as after eating. Denies fever, weight loss, vomiting, dysphagia. Currently on omeprazole 40 mg daily, pepcid 20 mg qhs. States pepcid was started last week and has helped. Also taking zofran prn nausea. Review of previous GI note from 2018 showed pt had c/o GERD and nausea at that time as well. She does take Percocet at least BID which she thinks could be contributing. Per pt, last EGD/colonoscopy 2022 and were unremarkable but unfortunately operative reports are not present in chart.     PMHx/PSHx  PMHx: GERD, diverticulitis, CKD, HTN, C diff  PSHx: cholecystectomy, hysterectomy    Family History  Denies FMH gastric or colorectal cancer.   Denies FMH autoimmune disease.     Social History  Smoking history: never  Alcohol use: denies    ROS  Review of Systems   Constitutional:  Negative for activity change, appetite change, chills, fatigue, fever and unexpected weight change.   Gastrointestinal:  Positive for diarrhea and nausea. Negative for abdominal distention, abdominal pain, anal bleeding, blood in stool, constipation and vomiting.        GERD       Vitals:    04/02/25 0909   BP: (!) 140/73   Pulse: 89

## 2025-04-02 ENCOUNTER — OFFICE VISIT (OUTPATIENT)
Dept: GASTROENTEROLOGY | Age: 85
End: 2025-04-02
Payer: MEDICARE

## 2025-04-02 VITALS
SYSTOLIC BLOOD PRESSURE: 140 MMHG | HEART RATE: 89 BPM | BODY MASS INDEX: 21.48 KG/M2 | OXYGEN SATURATION: 97 % | WEIGHT: 145 LBS | HEIGHT: 69 IN | DIASTOLIC BLOOD PRESSURE: 73 MMHG

## 2025-04-02 DIAGNOSIS — R11.0 CHRONIC NAUSEA: ICD-10-CM

## 2025-04-02 DIAGNOSIS — K21.9 GASTROESOPHAGEAL REFLUX DISEASE, UNSPECIFIED WHETHER ESOPHAGITIS PRESENT: Primary | ICD-10-CM

## 2025-04-02 PROCEDURE — 1123F ACP DISCUSS/DSCN MKR DOCD: CPT | Performed by: PHYSICIAN ASSISTANT

## 2025-04-02 PROCEDURE — G8427 DOCREV CUR MEDS BY ELIG CLIN: HCPCS | Performed by: PHYSICIAN ASSISTANT

## 2025-04-02 PROCEDURE — G8420 CALC BMI NORM PARAMETERS: HCPCS | Performed by: PHYSICIAN ASSISTANT

## 2025-04-02 PROCEDURE — 1160F RVW MEDS BY RX/DR IN RCRD: CPT | Performed by: PHYSICIAN ASSISTANT

## 2025-04-02 PROCEDURE — 1159F MED LIST DOCD IN RCRD: CPT | Performed by: PHYSICIAN ASSISTANT

## 2025-04-02 PROCEDURE — 1090F PRES/ABSN URINE INCON ASSESS: CPT | Performed by: PHYSICIAN ASSISTANT

## 2025-04-02 PROCEDURE — 1036F TOBACCO NON-USER: CPT | Performed by: PHYSICIAN ASSISTANT

## 2025-04-02 PROCEDURE — 1111F DSCHRG MED/CURRENT MED MERGE: CPT | Performed by: PHYSICIAN ASSISTANT

## 2025-04-02 PROCEDURE — 99204 OFFICE O/P NEW MOD 45 MIN: CPT | Performed by: PHYSICIAN ASSISTANT

## 2025-04-02 RX ORDER — ONDANSETRON 4 MG/1
4 TABLET, ORALLY DISINTEGRATING ORAL 3 TIMES DAILY PRN
Qty: 21 TABLET | Refills: 0 | Status: SHIPPED | OUTPATIENT
Start: 2025-04-02 | End: 2025-04-09

## 2025-04-02 RX ORDER — FAMOTIDINE 40 MG/1
40 TABLET, FILM COATED ORAL EVERY EVENING
Qty: 90 TABLET | Refills: 0 | Status: SHIPPED | OUTPATIENT
Start: 2025-04-02 | End: 2025-07-01

## 2025-04-02 ASSESSMENT — ENCOUNTER SYMPTOMS
BLOOD IN STOOL: 0
VOMITING: 0
ANAL BLEEDING: 0
CONSTIPATION: 0
ABDOMINAL DISTENTION: 0
ABDOMINAL PAIN: 0
NAUSEA: 1
DIARRHEA: 1

## 2025-04-02 NOTE — PATIENT INSTRUCTIONS
Increase omeprazole. Take 40 mg before breakfast and 40 mg before dinner.     Increase pepcid to 40 mg at night.     Can take zofran as needed for nausea.     Can take ginger gummies or candies prior to eating to help with nausea. Can also trying drinking ginger tea twice a day.     We want you to have 1 soft bowel movement per day. Use miralax as needed.

## 2025-04-03 ENCOUNTER — OFFICE VISIT (OUTPATIENT)
Dept: FAMILY MEDICINE CLINIC | Facility: CLINIC | Age: 85
End: 2025-04-03

## 2025-04-03 VITALS
HEART RATE: 110 BPM | TEMPERATURE: 98.7 F | BODY MASS INDEX: 20.67 KG/M2 | DIASTOLIC BLOOD PRESSURE: 80 MMHG | SYSTOLIC BLOOD PRESSURE: 124 MMHG | OXYGEN SATURATION: 98 % | WEIGHT: 140 LBS

## 2025-04-03 DIAGNOSIS — G89.29 CHRONIC BACK PAIN, UNSPECIFIED BACK LOCATION, UNSPECIFIED BACK PAIN LATERALITY: Chronic | ICD-10-CM

## 2025-04-03 DIAGNOSIS — G62.9 POLYNEUROPATHY: Primary | ICD-10-CM

## 2025-04-03 DIAGNOSIS — J18.9 PNEUMONIA OF RIGHT MIDDLE LOBE DUE TO INFECTIOUS ORGANISM: ICD-10-CM

## 2025-04-03 DIAGNOSIS — D64.9 ANEMIA, UNSPECIFIED TYPE: ICD-10-CM

## 2025-04-03 DIAGNOSIS — R53.83 FATIGUE, UNSPECIFIED TYPE: ICD-10-CM

## 2025-04-03 DIAGNOSIS — N18.9 CHRONIC KIDNEY DISEASE, UNSPECIFIED CKD STAGE: ICD-10-CM

## 2025-04-03 DIAGNOSIS — L29.9 EAR ITCHING: ICD-10-CM

## 2025-04-03 DIAGNOSIS — F41.8 ANXIOUS DEPRESSION: ICD-10-CM

## 2025-04-03 DIAGNOSIS — G62.9 POLYNEUROPATHY: ICD-10-CM

## 2025-04-03 DIAGNOSIS — M54.9 CHRONIC BACK PAIN, UNSPECIFIED BACK LOCATION, UNSPECIFIED BACK PAIN LATERALITY: Chronic | ICD-10-CM

## 2025-04-03 DIAGNOSIS — K13.79 MOUTH PAIN: ICD-10-CM

## 2025-04-03 PROBLEM — M18.12 ARTHRITIS OF CARPOMETACARPAL (CMC) JOINT OF LEFT THUMB: Status: RESOLVED | Noted: 2023-06-15 | Resolved: 2025-04-03

## 2025-04-03 PROBLEM — S63.502A WRIST SPRAIN, LEFT, INITIAL ENCOUNTER: Status: RESOLVED | Noted: 2023-06-15 | Resolved: 2025-04-03

## 2025-04-03 PROBLEM — I95.1 POSTURAL HYPOTENSION: Status: RESOLVED | Noted: 2023-06-08 | Resolved: 2025-04-03

## 2025-04-03 PROBLEM — N95.2 VAGINAL ATROPHY: Status: RESOLVED | Noted: 2022-01-31 | Resolved: 2025-04-03

## 2025-04-03 PROBLEM — R19.7 DIARRHEA: Status: RESOLVED | Noted: 2018-01-04 | Resolved: 2025-04-03

## 2025-04-03 PROBLEM — N95.2 VAGINAL ATROPHY: Status: ACTIVE | Noted: 2022-01-31

## 2025-04-03 PROBLEM — N81.11 MIDLINE CYSTOCELE: Status: RESOLVED | Noted: 2022-01-31 | Resolved: 2025-04-03

## 2025-04-03 PROBLEM — M48.062 SPINAL STENOSIS, LUMBAR REGION WITH NEUROGENIC CLAUDICATION: Status: ACTIVE | Noted: 2019-05-22

## 2025-04-03 PROBLEM — G56.02 LEFT CARPAL TUNNEL SYNDROME: Status: RESOLVED | Noted: 2023-06-15 | Resolved: 2025-04-03

## 2025-04-03 LAB
FERRITIN SERPL-MCNC: 77 NG/ML (ref 8–388)
HCT VFR BLD AUTO: 37.5 % (ref 35.8–46.3)
HGB BLD-MCNC: 12.4 G/DL (ref 11.7–15.4)
IRON SATN MFR SERPL: 26 % (ref 20–50)
IRON SERPL-MCNC: 81 UG/DL (ref 35–100)
TIBC SERPL-MCNC: 308 UG/DL (ref 240–450)
UIBC SERPL-MCNC: 227 UG/DL (ref 112–347)
VIT B12 SERPL-MCNC: 1324 PG/ML (ref 193–986)

## 2025-04-03 ASSESSMENT — ENCOUNTER SYMPTOMS
BACK PAIN: 1
NAUSEA: 0
TROUBLE SWALLOWING: 0
SHORTNESS OF BREATH: 1
SORE THROAT: 1
BLOOD IN STOOL: 0
RHINORRHEA: 0
SINUS PRESSURE: 0
ANAL BLEEDING: 0
VOMITING: 0
WHEEZING: 0
COUGH: 0
ABDOMINAL PAIN: 0

## 2025-04-03 NOTE — ASSESSMENT & PLAN NOTE
Chronic, not at goal (unstable), changes made today: add back amitriptyline previously taking and previously tolerated.  She was unable to tolerate gabapentin.  Agrees to f/u with pain management as they had prescribed gabapentin for alternatives such as pregabalin as she has pain contract with pain management for controlled substances and most likely related to chronic back pain s/p multiple surgeries.     Orders:    amitriptyline (ELAVIL) 25 MG tablet; Take 2 tablets by mouth nightly    Vitamin B12; Future

## 2025-04-03 NOTE — PATIENT INSTRUCTIONS
Radiology [phone]  [facility] 2nd Floor.  Follow up chest xray may get completed 05/01/25 outpatient center walk in or call to schedule.

## 2025-04-03 NOTE — ASSESSMENT & PLAN NOTE
New, at goal (stable), still some fatigue but breathing close to baseline.  No fever or chills.  No cough.  Follow up imaging in May.    Orders:    XR CHEST PA LAT (2 VIEWS); Future

## 2025-04-03 NOTE — ASSESSMENT & PLAN NOTE
New, at goal (stable), continue current plan pending work up below  Recheck with fatigue symptoms.    Orders:    Hemoglobin and Hematocrit; Future    Ferritin; Future    Iron and TIBC; Future

## 2025-04-03 NOTE — ASSESSMENT & PLAN NOTE
Chronic, not at goal (unstable), changes made today: add amitriptyline.      Orders:    amitriptyline (ELAVIL) 25 MG tablet; Take 2 tablets by mouth nightly

## 2025-04-03 NOTE — ASSESSMENT & PLAN NOTE
New, at goal (stable), multiple hospitalizations and surgery.  Plans to f/u with PT to increase activity and strengthening.  Check her anemia for stability.  B12 level with neuropathy.

## 2025-04-03 NOTE — PROGRESS NOTES
Nini Apple is a 84 y.o. female who presents today for the following:  Chief Complaint   Patient presents with    New Patient     Pt presents today as new pt establishing care. Pt has lt ear issues. Pt has rt sided throat pain. Pt has more tired recently.        No Known Allergies    Current Outpatient Medications   Medication Sig Dispense Refill    amitriptyline (ELAVIL) 25 MG tablet Take 2 tablets by mouth nightly 60 tablet 2    ondansetron (ZOFRAN-ODT) 4 MG disintegrating tablet Take 1 tablet by mouth 3 times daily as needed for Nausea or Vomiting 21 tablet 0    famotidine (PEPCID) 40 MG tablet Take 1 tablet by mouth every evening 90 tablet 0    bisacodyl (DULCOLAX) 5 MG EC tablet Take 2 tablets by mouth daily as needed for Constipation      ondansetron (ZOFRAN) 4 MG tablet Take 1 tablet by mouth every 6 hours as needed for Nausea or Vomiting 30 tablet 0    aspirin 81 MG EC tablet Take 1 tablet by mouth 2 times daily 60 tablet 0    linaclotide (LINZESS) 145 MCG capsule Take 1 capsule by mouth every morning (before breakfast)      melatonin 5 MG TBDP disintegrating tablet Take 1 tablet by mouth nightly      amLODIPine (NORVASC) 2.5 MG tablet Take 2 tablets by mouth daily      famotidine (PEPCID) 20 MG tablet TAKE 1 TABLET BY MOUTH EVERY EVENING FOR STOMACH (Patient not taking: Reported on 4/3/2025) 90 tablet 0    polyethylene glycol (GLYCOLAX) 17 GM/SCOOP powder Take 17 g by mouth daily as needed (constipation) (Patient not taking: Reported on 4/3/2025)      methocarbamol (ROBAXIN-750) 750 MG tablet Take 1 tablet by mouth every 6 hours as needed for spasms. (Patient not taking: Reported on 4/3/2025) 40 tablet 0    omeprazole (PRILOSEC) 20 MG delayed release capsule Take 2 capsules by mouth daily (Patient not taking: Reported on 4/3/2025)       No current facility-administered medications for this visit.       Past Medical History:   Diagnosis Date    Arthritis     osteoarthritis - spinal, hips, knees

## 2025-04-04 ENCOUNTER — RESULTS FOLLOW-UP (OUTPATIENT)
Dept: FAMILY MEDICINE CLINIC | Facility: CLINIC | Age: 85
End: 2025-04-04

## 2025-04-04 ENCOUNTER — CARE COORDINATION (OUTPATIENT)
Dept: CARE COORDINATION | Facility: CLINIC | Age: 85
End: 2025-04-04

## 2025-04-04 NOTE — CARE COORDINATION
Care Transitions Note    Follow Up Call     Attempted to reach patient for transitions of care follow up.  Unable to reach patient.      Outreach Attempts:   HIPAA compliant voicemail left for patient.       Follow Up Appointment:   Future Appointments         Provider Specialty Dept Phone    5/15/2025 2:00 PM April Alvarado APRN - CNP Family Medicine [phone]    6/2/2025 1:30 PM Viola Rico PA-C Gastroenterology [phone]    7/28/2025 9:35 AM Rodrigo Aguilar MD Orthopedic Surgery [phone]            Plan for follow-up call in 6-10 days based on severity of symptoms and risk factors. Plan for next call: symptom management    Raven Potts LPN

## 2025-04-05 NOTE — RESULT ENCOUNTER NOTE
Labs look good overall; B12 was high; may hold supplement if taking or decreasing the supplement.  Please let me know if pt has any additional questions.  Keep scheduled appointment.

## 2025-04-07 RX ORDER — AMLODIPINE BESYLATE 2.5 MG/1
2.5 TABLET ORAL DAILY
Qty: 90 TABLET | Refills: 2 | Status: SHIPPED | OUTPATIENT
Start: 2025-04-07

## 2025-04-07 NOTE — TELEPHONE ENCOUNTER
Pt is needing a refill for RX amLODIPine (NORVASC) 2.5 MG tablet [[id number]] sent to [id number] - [place], [place] - [address] [facility] [phone] - F [phone]  [address] [facility] [id number]  Phone: [phone]  Fax: [phone]     Last appt 4/3/2025  Next appt 5/15/2025

## 2025-04-11 ENCOUNTER — CARE COORDINATION (OUTPATIENT)
Dept: CARE COORDINATION | Facility: CLINIC | Age: 85
End: 2025-04-11

## 2025-04-11 NOTE — CARE COORDINATION
Care Transitions Note    Final Call     Attempted to reach patient for transitions of care follow up.  Unable to reach patient.      Outreach Attempts:   Unable to leave message.     Patient graduated from the Care Transitions program on 4/11/25.  Patient/family has the ability to self-manage at this time..      Handoff:   Patient was not referred to the ACM team due to unable to contact patient.      Care Summary Note: Unable to reach patient for final REJI outreach.  Patient has not set up voicemail.  Reviewed upcoming appointments, scheduled appropriately.    Assessments:   Goals Addressed                   This Visit's Progress     COMPLETED: Returns to baseline activity level.        Patient/Family able to obtain medicine after d/c     Patient/Family able to verbalize medicine changes     Patient/Family aware and attends follow up appointments s/p d/c.     Patient/Family agrees to notify provider of any barriers to plan of care.    Patient/Family agrees to notify provider of any symptoms that indicate a worsening of condition                 Upcoming Appointments:    Future Appointments         Provider Specialty Dept Phone    5/15/2025 2:00 PM April Alvarado APRN - CNP Family Medicine [phone]    6/2/2025 1:30 PM Viola Rico PA-C Gastroenterology [phone]    7/28/2025 9:35 AM Rodrigo Aguilar MD Orthopedic Surgery [phone]            Raven Potts LPN

## 2025-04-22 DIAGNOSIS — Z12.31 VISIT FOR SCREENING MAMMOGRAM: Primary | ICD-10-CM

## 2025-04-23 ENCOUNTER — TELEPHONE (OUTPATIENT)
Dept: FAMILY MEDICINE CLINIC | Facility: CLINIC | Age: 85
End: 2025-04-23

## 2025-04-23 NOTE — TELEPHONE ENCOUNTER
Patient was in the hospital and went to go back to physical therapy but patient needs a new referral sent.

## 2025-04-27 ENCOUNTER — HOSPITAL ENCOUNTER (EMERGENCY)
Age: 85
Discharge: HOME | End: 2025-04-27
Attending: EMERGENCY MEDICINE
Payer: MEDICARE

## 2025-04-27 ENCOUNTER — APPOINTMENT (OUTPATIENT)
Dept: GENERAL RADIOLOGY | Age: 85
End: 2025-04-27
Payer: MEDICARE

## 2025-04-27 VITALS
DIASTOLIC BLOOD PRESSURE: 68 MMHG | TEMPERATURE: 99.1 F | HEART RATE: 97 BPM | SYSTOLIC BLOOD PRESSURE: 142 MMHG | OXYGEN SATURATION: 97 % | RESPIRATION RATE: 18 BRPM

## 2025-04-27 DIAGNOSIS — R51.9 ACUTE NONINTRACTABLE HEADACHE, UNSPECIFIED HEADACHE TYPE: ICD-10-CM

## 2025-04-27 DIAGNOSIS — U07.1 COVID-19: Primary | ICD-10-CM

## 2025-04-27 LAB
ANION GAP SERPL CALC-SCNC: 11 MMOL/L (ref 7–16)
BASOPHILS # BLD: 0.02 K/UL (ref 0–0.2)
BASOPHILS NFR BLD: 0.3 % (ref 0–2)
BILIRUB UR QL: NEGATIVE
BUN SERPL-MCNC: 12 MG/DL (ref 8–23)
CALCIUM SERPL-MCNC: 9.5 MG/DL (ref 8.8–10.2)
CHLORIDE SERPL-SCNC: 102 MMOL/L (ref 98–107)
CO2 SERPL-SCNC: 23 MMOL/L (ref 20–29)
CREAT SERPL-MCNC: 0.99 MG/DL (ref 0.6–1.1)
DIFFERENTIAL METHOD BLD: ABNORMAL
EOSINOPHIL # BLD: 0.02 K/UL (ref 0–0.8)
EOSINOPHIL NFR BLD: 0.3 % (ref 0.5–7.8)
ERYTHROCYTE [DISTWIDTH] IN BLOOD BY AUTOMATED COUNT: 13.2 % (ref 11.9–14.6)
FLUAV RNA SPEC QL NAA+PROBE: NOT DETECTED
FLUBV RNA SPEC QL NAA+PROBE: NOT DETECTED
GLUCOSE SERPL-MCNC: 126 MG/DL (ref 70–99)
GLUCOSE UR QL STRIP.AUTO: NEGATIVE MG/DL
HCT VFR BLD AUTO: 37.5 % (ref 35.8–46.3)
HGB BLD-MCNC: 12.7 G/DL (ref 11.7–15.4)
IMM GRANULOCYTES # BLD AUTO: 0.02 K/UL (ref 0–0.5)
IMM GRANULOCYTES NFR BLD AUTO: 0.3 % (ref 0–5)
KETONES UR-MCNC: NEGATIVE MG/DL
LEUKOCYTE ESTERASE UR QL STRIP: NEGATIVE
LYMPHOCYTES # BLD: 1.18 K/UL (ref 0.5–4.6)
LYMPHOCYTES NFR BLD: 17.3 % (ref 13–44)
MCH RBC QN AUTO: 30.5 PG (ref 26.1–32.9)
MCHC RBC AUTO-ENTMCNC: 33.9 G/DL (ref 31.4–35)
MCV RBC AUTO: 90.1 FL (ref 82–102)
MONOCYTES # BLD: 0.69 K/UL (ref 0.1–1.3)
MONOCYTES NFR BLD: 10.1 % (ref 4–12)
NEUTS SEG # BLD: 4.89 K/UL (ref 1.7–8.2)
NEUTS SEG NFR BLD: 71.7 % (ref 43–78)
NITRITE UR QL: NEGATIVE
NRBC # BLD: 0 K/UL (ref 0–0.2)
PH UR: 7 (ref 5–9)
PLATELET # BLD AUTO: 220 K/UL (ref 150–450)
PMV BLD AUTO: 9.5 FL (ref 9.4–12.3)
POTASSIUM SERPL-SCNC: 3.8 MMOL/L (ref 3.5–5.1)
PROT UR QL: NEGATIVE MG/DL
RBC # BLD AUTO: 4.16 M/UL (ref 4.05–5.2)
RBC # UR STRIP: NEGATIVE
SARS-COV-2 RDRP RESP QL NAA+PROBE: DETECTED
SERVICE CMNT-IMP: NORMAL
SODIUM SERPL-SCNC: 135 MMOL/L (ref 136–145)
SOURCE: ABNORMAL
SP GR UR: 1.01 (ref 1–1.02)
UROBILINOGEN UR QL: 0.2 EU/DL (ref 0.2–1)
WBC # BLD AUTO: 6.8 K/UL (ref 4.3–11.1)

## 2025-04-27 PROCEDURE — 87636 SARSCOV2 & INF A&B AMP PRB: CPT

## 2025-04-27 PROCEDURE — 2580000003 HC RX 258: Performed by: EMERGENCY MEDICINE

## 2025-04-27 PROCEDURE — 96374 THER/PROPH/DIAG INJ IV PUSH: CPT

## 2025-04-27 PROCEDURE — 81003 URINALYSIS AUTO W/O SCOPE: CPT

## 2025-04-27 PROCEDURE — 80048 BASIC METABOLIC PNL TOTAL CA: CPT

## 2025-04-27 PROCEDURE — 6360000002 HC RX W HCPCS: Performed by: EMERGENCY MEDICINE

## 2025-04-27 PROCEDURE — 96375 TX/PRO/DX INJ NEW DRUG ADDON: CPT

## 2025-04-27 PROCEDURE — 85025 COMPLETE CBC W/AUTO DIFF WBC: CPT

## 2025-04-27 PROCEDURE — 6370000000 HC RX 637 (ALT 250 FOR IP): Performed by: EMERGENCY MEDICINE

## 2025-04-27 PROCEDURE — 99284 EMERGENCY DEPT VISIT MOD MDM: CPT

## 2025-04-27 PROCEDURE — 71046 X-RAY EXAM CHEST 2 VIEWS: CPT

## 2025-04-27 RX ORDER — 0.9 % SODIUM CHLORIDE 0.9 %
500 INTRAVENOUS SOLUTION INTRAVENOUS
Status: COMPLETED | OUTPATIENT
Start: 2025-04-27 | End: 2025-04-27

## 2025-04-27 RX ORDER — METOCLOPRAMIDE HYDROCHLORIDE 5 MG/ML
5 INJECTION INTRAMUSCULAR; INTRAVENOUS
Status: COMPLETED | OUTPATIENT
Start: 2025-04-27 | End: 2025-04-27

## 2025-04-27 RX ORDER — BENZONATATE 200 MG/1
200 CAPSULE ORAL 3 TIMES DAILY PRN
Qty: 30 CAPSULE | Refills: 0 | Status: SHIPPED | OUTPATIENT
Start: 2025-04-27 | End: 2025-05-07

## 2025-04-27 RX ORDER — KETOROLAC TROMETHAMINE 15 MG/ML
15 INJECTION, SOLUTION INTRAMUSCULAR; INTRAVENOUS
Status: COMPLETED | OUTPATIENT
Start: 2025-04-27 | End: 2025-04-27

## 2025-04-27 RX ORDER — GUAIFENESIN 600 MG/1
600 TABLET, EXTENDED RELEASE ORAL 2 TIMES DAILY
Status: DISCONTINUED | OUTPATIENT
Start: 2025-04-27 | End: 2025-04-27 | Stop reason: HOSPADM

## 2025-04-27 RX ORDER — PROMETHAZINE HYDROCHLORIDE 25 MG/1
25 TABLET ORAL EVERY 6 HOURS PRN
Qty: 20 TABLET | Refills: 0 | Status: SHIPPED | OUTPATIENT
Start: 2025-04-27 | End: 2025-05-02

## 2025-04-27 RX ORDER — ALBUTEROL SULFATE 90 UG/1
2 INHALANT RESPIRATORY (INHALATION) 4 TIMES DAILY PRN
Qty: 18 G | Refills: 0 | Status: SHIPPED | OUTPATIENT
Start: 2025-04-27

## 2025-04-27 RX ADMIN — SODIUM CHLORIDE 500 ML: 9 INJECTION, SOLUTION INTRAVENOUS at 02:50

## 2025-04-27 RX ADMIN — METOCLOPRAMIDE 5 MG: 5 INJECTION, SOLUTION INTRAMUSCULAR; INTRAVENOUS at 02:50

## 2025-04-27 RX ADMIN — GUAIFENESIN 600 MG: 600 TABLET ORAL at 02:47

## 2025-04-27 RX ADMIN — KETOROLAC TROMETHAMINE 15 MG: 15 INJECTION, SOLUTION INTRAMUSCULAR; INTRAVENOUS at 03:56

## 2025-04-27 ASSESSMENT — PAIN DESCRIPTION - LOCATION
LOCATION: HEAD
LOCATION: HEAD

## 2025-04-27 ASSESSMENT — PAIN SCALES - GENERAL
PAINLEVEL_OUTOF10: 8
PAINLEVEL_OUTOF10: 4
PAINLEVEL_OUTOF10: 8

## 2025-04-27 ASSESSMENT — PAIN DESCRIPTION - DESCRIPTORS: DESCRIPTORS: ACHING

## 2025-04-27 ASSESSMENT — PAIN - FUNCTIONAL ASSESSMENT
PAIN_FUNCTIONAL_ASSESSMENT: 0-10
PAIN_FUNCTIONAL_ASSESSMENT: 0-10

## 2025-04-27 NOTE — ED PROVIDER NOTES
Frequency of Communication with Friends and Family: Not asked     Frequency of Social Gatherings with Friends and Family: Not asked   Intimate Partner Violence: Unknown (7/23/2024)    Received from [facility]    Intimate Partner Violence     Fear of Current or Ex-Partner: Not asked     Emotionally Abused: Not asked     Physically Abused: Not asked     Sexually Abused: Not asked   Housing Stability: Patient Declined (3/9/2025)    Housing Stability Vital Sign     Unable to Pay for Housing in the Last Year: Patient declined     Number of Times Moved in the Last Year: 0     Homeless in the Last Year: Patient declined        Previous Medications    AMITRIPTYLINE (ELAVIL) 25 MG TABLET    Take 2 tablets by mouth nightly    AMLODIPINE (NORVASC) 2.5 MG TABLET    Take 1 tablet by mouth daily    ASPIRIN 81 MG EC TABLET    Take 1 tablet by mouth 2 times daily    BISACODYL (DULCOLAX) 5 MG EC TABLET    Take 2 tablets by mouth daily as needed for Constipation    FAMOTIDINE (PEPCID) 20 MG TABLET    TAKE 1 TABLET BY MOUTH EVERY EVENING FOR STOMACH    FAMOTIDINE (PEPCID) 40 MG TABLET    Take 1 tablet by mouth every evening    LINACLOTIDE (LINZESS) 145 MCG CAPSULE    Take 1 capsule by mouth every morning (before breakfast)    MELATONIN 5 MG TBDP DISINTEGRATING TABLET    Take 1 tablet by mouth nightly    METHOCARBAMOL (ROBAXIN-750) 750 MG TABLET    Take 1 tablet by mouth every 6 hours as needed for spasms.    OMEPRAZOLE (PRILOSEC) 20 MG DELAYED RELEASE CAPSULE    Take 2 capsules by mouth daily    ONDANSETRON (ZOFRAN) 4 MG TABLET    Take 1 tablet by mouth every 6 hours as needed for Nausea or Vomiting    POLYETHYLENE GLYCOL (GLYCOLAX) 17 GM/SCOOP POWDER    Take 17 g by mouth daily as needed (constipation)        Results from this emergency department visit:      Results for orders placed or performed during the hospital encounter of 04/27/25   COVID-19 & Influenza Combo    Specimen: Swab   Result Value Ref Range

## 2025-04-29 DIAGNOSIS — Z01.89 PHYSICAL THERAPY EVALUATION, INITIAL: Primary | ICD-10-CM

## 2025-04-29 DIAGNOSIS — M17.11 PRIMARY OSTEOARTHRITIS OF RIGHT KNEE: ICD-10-CM

## 2025-04-29 DIAGNOSIS — Z96.651 HX OF TOTAL KNEE ARTHROPLASTY, RIGHT: ICD-10-CM

## 2025-05-01 ENCOUNTER — CARE COORDINATION (OUTPATIENT)
Dept: CARE COORDINATION | Facility: CLINIC | Age: 85
End: 2025-05-01

## 2025-05-05 ENCOUNTER — CARE COORDINATION (OUTPATIENT)
Dept: CARE COORDINATION | Facility: CLINIC | Age: 85
End: 2025-05-05

## 2025-05-05 NOTE — CARE COORDINATION
Ambulatory Care Coordination Note     5/5/2025 2:15 PM     Care Summary Note:   Outreach for High Risk Case Management - spoke with patient  Reports she is continuing to improve   - sleeping well   - continues medication   - occasional cough   - continues to hydrate, good appetite  PCP follow up appointment in a week.     ACM: Sophia Mendez RN     PCP/Specialist follow up:   Future Appointments         Provider Specialty Dept Phone    5/15/2025 2:00 PM April Alvarado APRN - CNP Family Medicine [phone]    6/2/2025 1:30 PM Viola Rico PA-C Gastroenterology [phone]    7/28/2025 9:35 AM Rodrigo Aguilar MD Orthopedic Surgery [phone]

## 2025-05-12 ENCOUNTER — CARE COORDINATION (OUTPATIENT)
Dept: CARE COORDINATION | Facility: CLINIC | Age: 85
End: 2025-05-12

## 2025-05-12 NOTE — CARE COORDINATION
Ambulatory Care Coordination Note     5/12/2025 11:42 AM     Care Summary Note:   Outreach for High Risk Case Management  Unable to reach  Left VM requesting return call  Patient has PCP follow up appointment later this week.     ACM: Sophia Mendez, ALLA     PCP/Specialist follow up:   Future Appointments         Provider Specialty Dept Phone    5/15/2025 2:00 PM April Alvarado, APRN - CNP Family Medicine [phone]    6/2/2025 1:30 PM Viola Rico PA-C Gastroenterology [phone]    7/28/2025 9:35 AM Rodrigo Aguilar MD Orthopedic Surgery [phone]

## 2025-05-15 ENCOUNTER — OFFICE VISIT (OUTPATIENT)
Dept: FAMILY MEDICINE CLINIC | Facility: CLINIC | Age: 85
End: 2025-05-15

## 2025-05-15 VITALS
TEMPERATURE: 97.8 F | BODY MASS INDEX: 21.12 KG/M2 | OXYGEN SATURATION: 97 % | HEART RATE: 90 BPM | WEIGHT: 143 LBS | SYSTOLIC BLOOD PRESSURE: 128 MMHG | DIASTOLIC BLOOD PRESSURE: 70 MMHG

## 2025-05-15 DIAGNOSIS — Z00.00 MEDICARE ANNUAL WELLNESS VISIT, SUBSEQUENT: Primary | ICD-10-CM

## 2025-05-15 DIAGNOSIS — M17.11 PRIMARY OSTEOARTHRITIS OF RIGHT KNEE: ICD-10-CM

## 2025-05-15 DIAGNOSIS — I10 PRIMARY HYPERTENSION: Chronic | ICD-10-CM

## 2025-05-15 DIAGNOSIS — Z13.820 ENCOUNTER FOR OSTEOPOROSIS SCREENING IN ASYMPTOMATIC POSTMENOPAUSAL PATIENT: ICD-10-CM

## 2025-05-15 DIAGNOSIS — Z78.0 ENCOUNTER FOR OSTEOPOROSIS SCREENING IN ASYMPTOMATIC POSTMENOPAUSAL PATIENT: ICD-10-CM

## 2025-05-15 PROBLEM — J18.9 PNEUMONIA OF RIGHT MIDDLE LOBE DUE TO INFECTIOUS ORGANISM: Status: RESOLVED | Noted: 2025-04-03 | Resolved: 2025-05-15

## 2025-05-15 RX ORDER — METHOCARBAMOL 750 MG/1
TABLET, FILM COATED ORAL
Qty: 40 TABLET | Refills: 0 | Status: SHIPPED | OUTPATIENT
Start: 2025-05-15

## 2025-05-15 ASSESSMENT — PATIENT HEALTH QUESTIONNAIRE - PHQ9
2. FEELING DOWN, DEPRESSED OR HOPELESS: SEVERAL DAYS
10. IF YOU CHECKED OFF ANY PROBLEMS, HOW DIFFICULT HAVE THESE PROBLEMS MADE IT FOR YOU TO DO YOUR WORK, TAKE CARE OF THINGS AT HOME, OR GET ALONG WITH OTHER PEOPLE: NOT DIFFICULT AT ALL
3. TROUBLE FALLING OR STAYING ASLEEP: NOT AT ALL
SUM OF ALL RESPONSES TO PHQ QUESTIONS 1-9: 1
4. FEELING TIRED OR HAVING LITTLE ENERGY: NOT AT ALL
5. POOR APPETITE OR OVEREATING: NOT AT ALL
SUM OF ALL RESPONSES TO PHQ QUESTIONS 1-9: 1
1. LITTLE INTEREST OR PLEASURE IN DOING THINGS: NOT AT ALL
9. THOUGHTS THAT YOU WOULD BE BETTER OFF DEAD, OR OF HURTING YOURSELF: NOT AT ALL
SUM OF ALL RESPONSES TO PHQ QUESTIONS 1-9: 1
SUM OF ALL RESPONSES TO PHQ QUESTIONS 1-9: 1
7. TROUBLE CONCENTRATING ON THINGS, SUCH AS READING THE NEWSPAPER OR WATCHING TELEVISION: NOT AT ALL
8. MOVING OR SPEAKING SO SLOWLY THAT OTHER PEOPLE COULD HAVE NOTICED. OR THE OPPOSITE, BEING SO FIGETY OR RESTLESS THAT YOU HAVE BEEN MOVING AROUND A LOT MORE THAN USUAL: NOT AT ALL
6. FEELING BAD ABOUT YOURSELF - OR THAT YOU ARE A FAILURE OR HAVE LET YOURSELF OR YOUR FAMILY DOWN: NOT AT ALL

## 2025-05-15 NOTE — PROGRESS NOTES
Medicare Annual Wellness Visit    Nini Apple is here for Medicare AWV (Pt presents today for AWV. )    Assessment & Plan  Muscle spasms  - Reports experiencing muscle spasms in thigh and leg, particularly at night  - Currently taking Robaxin, which helps alleviate spasms  - Discussed effectiveness of Robaxin in managing symptoms  - Refill for Robaxin will be provided    Hypertension  - Experienced significant increase in blood pressure, reaching 200 systolic, leading to taking two tablets of blood pressure medication  - Blood pressure currently stable  - Advised to continue monitoring blood pressure at home  - Maintain current medication regimen    Headaches  - Reports experiencing light headaches in the morning after taking medication, which subside as the day progresses  - CT scan performed with normal results  - Advised to follow up with an eye doctor to investigate cause of left eye discomfort and yellow discharge  - Discussed potential link between headaches and medication    Neuropathy  - Has neuropathy in feet and numbness in fingers  - Previously prescribed amitriptyline 25 mg, two tablets at night, taken intermittently  - Advised to continue taking amitriptyline as prescribed to help manage symptoms  - Discussed effectiveness and side effects of amitriptyline    Recent COVID-19 infection  - Recently diagnosed with COVID-19 but did not experience significant symptoms  - Given two prescriptions to take for five days  - Advised to wait at least 90 days before receiving COVID-19 vaccine due to recent infection  - Discussed immunity and timing for vaccination    Health maintenance  - Blood work results within normal limits  - Ordered DEXA scan to assess bone density  - Advised to schedule D[facility]A scan at [facility]  - Discussed importance of bone density assessment    Follow-up  - Follow up in 6 months    Medicare annual wellness visit, subsequent  Primary osteoarthritis of right

## 2025-05-15 NOTE — PATIENT INSTRUCTIONS
[facility]  Located in: [facility]  Address: [address] [place] [address]  [address], [place] [address]  Hours: Opens 7?AM  Phone: [phone]       Preventing Falls: Care Instructions  Injuries and health problems such as trouble walking or poor eyesight can increase your risk of falling. So can some medicines. But there are things you can do to help prevent falls. You can exercise to get stronger. You can also arrange your home to make it safer.    Talk to your doctor about the medicines you take. Ask if any of them increase the risk of falls and whether they can be changed or stopped.   Try to exercise regularly. It can help improve your strength and balance. This can help lower your risk of falling.         Practice fall safety and prevention.   Wear low-heeled shoes that fit well and give your feet good support. Talk to your doctor if you have foot problems that make this hard.  Carry a cellphone or wear a medical alert device that you can use to call for help.  Use stepladders instead of chairs to reach high objects. Don't climb if you're at risk for falls. Ask for help, if needed.  Wear the correct eyeglasses, if you need them.        Make your home safer.   Remove rugs, cords, clutter, and furniture from walkways.  Keep your house well lit. Use night-lights in hallways and bathrooms.  Install and use sturdy handrails on stairways.  Wear nonskid footwear, even inside. Don't walk barefoot or in socks without shoes.        Be safe outside.   Use handrails, curb cuts, and ramps whenever possible.  Keep your hands free by using a shoulder bag or backpack.  Try to walk in well-lit areas. Watch out for uneven ground, changes in pavement, and debris.  Be careful in the winter. Walk on the grass or gravel when sidewalks are slippery. Use de-icer on steps and walkways. Add non-slip devices to shoes.    Put grab bars and nonskid mats in your shower or tub and near the toilet. Try to

## 2025-05-17 NOTE — ADDENDUM NOTE
Addended by: ELOY SEVILLA on: 5/16/2025 09:20 PM     Modules accepted: Level of Service     Quinolones Pregnancy And Lactation Text: This medication is Pregnancy Category C and it isn't know if it is safe during pregnancy. It is also excreted in breast milk.

## 2025-05-22 ENCOUNTER — CARE COORDINATION (OUTPATIENT)
Dept: CARE COORDINATION | Facility: CLINIC | Age: 85
End: 2025-05-22

## 2025-05-22 NOTE — CARE COORDINATION
Ambulatory Care Coordination Note     5/22/2025 2:20 PM     Care Summary Note:   Outreach for High Risk Case Management  Unable to reach  Left message on identified VM  Patient has seen PCP in follow up for AWV     ACM: Sophia Mendez, RN     PCP/Specialist follow up:   Future Appointments         Provider Specialty Dept Phone    5/29/2025 3:00 PM Britney Barbour, PT Physical Therapy [phone]    6/2/2025 1:30 PM Viola Rico PA-C Gastroenterology [phone]    7/28/2025 9:35 AM Rodrigo Aguilar MD Orthopedic Surgery [phone]    11/17/2025 2:00 PM April Alvarado APRN - CNP [facility] [phone]    5/18/2026 2:00 PM April Alvarado APRN - CNP Family Medicine [phone]

## 2025-05-29 ENCOUNTER — HOSPITAL ENCOUNTER (OUTPATIENT)
Dept: PHYSICAL THERAPY | Age: 85
Setting detail: RECURRING SERIES
Discharge: HOME | End: 2025-05-31
Attending: ORTHOPAEDIC SURGERY
Payer: MEDICARE

## 2025-05-29 DIAGNOSIS — R26.89 OTHER ABNORMALITIES OF GAIT AND MOBILITY: ICD-10-CM

## 2025-05-29 DIAGNOSIS — M25.561 ACUTE PAIN OF RIGHT KNEE: Primary | ICD-10-CM

## 2025-05-29 DIAGNOSIS — R26.81 UNSTEADINESS ON FEET: ICD-10-CM

## 2025-05-29 PROCEDURE — 97110 THERAPEUTIC EXERCISES: CPT

## 2025-05-29 PROCEDURE — 97161 PT EVAL LOW COMPLEX 20 MIN: CPT

## 2025-05-29 ASSESSMENT — PAIN SCALES - GENERAL: PAINLEVEL_OUTOF10: 5

## 2025-05-29 ASSESSMENT — KOOS JR
STANDING UPRIGHT: MODERATE
RISING FROM SITTING: MODERATE
KOOS JR TOTAL INTERVAL SCORE: 54.84
BENDING TO THE FLOOR TO PICK UP OBJECT: MODERATE
HOW SEVERE IS YOUR KNEE STIFFNESS AFTER FIRST WAKING IN MORNING: MILD
STRAIGHTENING KNEE FULLY: MODERATE
GOING UP OR DOWN STAIRS: MODERATE
TWISING OR PIVOTING ON KNEE: MODERATE

## 2025-05-29 NOTE — THERAPY EVALUATION
Nini Apple  : 1940  Primary: [facility] Dual Complete (Medicare Managed)  Secondary: MEDICAID [facility] @ [place] [address]  [facility] [address]  Phone: [phone]  Fax: [phone] Plan Frequency: 2x/week until end POC    Plan of Care/Certification Expiration Date: 25              Plan of Care/Certification Expiration Date:  Plan of Care/Certification Expiration Date: 25    Frequency/Duration:   Plan Frequency: 2x/week until end POC      Time In/Out:   Time In: 1503  Time Out: 1600       PT Visit Info:    Plan Frequency: 2x/week until end POC  Total # of Visits Approved: 0 (eval only+)  Total # of Visits to Date: 1  Progress Note Counter: 1      Visit Count:  1                OUTPATIENT PHYSICAL THERAPY:             Initial Assessment 2025                 Episode (R TKA 25)         Treatment Diagnosis:     Acute pain of right knee  Unsteadiness on feet  Other abnormalities of gait and mobility  Medical/Referring Diagnosis:    Physical therapy evaluation, initial  Primary osteoarthritis of right knee  Hx of total knee arthroplasty, right    Referring Physician:  April Alvarado, APRN - CNP  MD Orders:  PT Eval and Treat   Return MD Appt:  25 with Dr. Aguilar   Date of Onset:  Onset Date:  (25)  Allergies:  Patient has no known allergies.    Restrictions/Precautions/Relevant PMH:    HTN (well controlled with medication)       Medications Last Reviewed:  2025       SUBJECTIVE     History of Injury/Illness (Reason for Referral):  25: Nini Apple relates she had R TKA 25. She did not have PT (only did a few visits) afterwards as she got sick - pneumonia and was hospitalized. She was supposed to come again but then had COVID-19. She has a hx of lower back pain - 4 back surgeries, right MATTHEW.    Patient Stated Goal(s): move better    Initial:     5 (right knee)/10  with pain pill        Post Session:

## 2025-05-29 NOTE — PROGRESS NOTES
Nini Apple  : 1940  [facility]  Secondary: MEDICAID [place] [facility] @ [place] [address]  [place] [address]  Phone: [phone]  Fax: [phone] Plan Frequency: 2x/week until end POC  Plan of Care/Certification Expiration Date: 25            Plan of Care/Certification Expiration Date:  Plan of Care/Certification Expiration Date: 25    Frequency/Duration:   Plan Frequency: 2x/week until end POC      Time In/Out:   Time In: 1503  Time Out: 1600     PT Visit Info:    Plan Frequency: 2x/week until end POC  Total # of Visits Approved: 0 (eval only+)  Total # of Visits to Date: 1  Progress Note Counter: 1      Visit Count:  1    OUTPATIENT PHYSICAL THERAPY:   Treatment Note 2025       Charge Capture        Episode  (R TKA 25)               Treatment Diagnosis:    Acute pain of right knee  Unsteadiness on feet  Other abnormalities of gait and mobility  Medical/Referring Diagnosis:   Physical therapy evaluation, initial  Primary osteoarthritis of right knee  Hx of total knee arthroplasty, right   Referring Physician: April Alvarado, APRN - CNP  MD Orders: PT Eval and Treat   Return MD Appt:  25 with Dr. Aguilar  Date of Onset: Onset Date:  (25)  Allergies: Patient has no known allergies.  Interventions Planned (Treatment may consist of any combination of the following): See Assessment Note    Restrictions/Precautions/Relevant PMH:   HTN (well controlled with medication)     Subjective Comments: See Evaluation Note from today.    Initial Pain Level:     5 (right knee)/10   Post Session Pain Level:        (no change relayed)/10     Medications Last Reviewed: 2025    Updated Objective Findings: See Evaluation Note from today    Treatment     THERAPEUTIC ACTIVITY: (0 minutes): Therapeutic activities per grid below to improve mobility, strength and balance. Required visual, verbal and

## 2025-06-02 ENCOUNTER — OFFICE VISIT (OUTPATIENT)
Dept: GASTROENTEROLOGY | Age: 85
End: 2025-06-02
Payer: MEDICARE

## 2025-06-02 VITALS
HEIGHT: 69 IN | HEART RATE: 82 BPM | BODY MASS INDEX: 21.92 KG/M2 | WEIGHT: 148 LBS | DIASTOLIC BLOOD PRESSURE: 91 MMHG | SYSTOLIC BLOOD PRESSURE: 169 MMHG | OXYGEN SATURATION: 99 %

## 2025-06-02 DIAGNOSIS — R11.0 NAUSEA: ICD-10-CM

## 2025-06-02 DIAGNOSIS — K21.9 GASTROESOPHAGEAL REFLUX DISEASE, UNSPECIFIED WHETHER ESOPHAGITIS PRESENT: Primary | ICD-10-CM

## 2025-06-02 PROCEDURE — 99213 OFFICE O/P EST LOW 20 MIN: CPT | Performed by: PHYSICIAN ASSISTANT

## 2025-06-02 PROCEDURE — 1036F TOBACCO NON-USER: CPT | Performed by: PHYSICIAN ASSISTANT

## 2025-06-02 PROCEDURE — G8420 CALC BMI NORM PARAMETERS: HCPCS | Performed by: PHYSICIAN ASSISTANT

## 2025-06-02 PROCEDURE — 1123F ACP DISCUSS/DSCN MKR DOCD: CPT | Performed by: PHYSICIAN ASSISTANT

## 2025-06-02 PROCEDURE — 1159F MED LIST DOCD IN RCRD: CPT | Performed by: PHYSICIAN ASSISTANT

## 2025-06-02 PROCEDURE — G8427 DOCREV CUR MEDS BY ELIG CLIN: HCPCS | Performed by: PHYSICIAN ASSISTANT

## 2025-06-02 PROCEDURE — 1090F PRES/ABSN URINE INCON ASSESS: CPT | Performed by: PHYSICIAN ASSISTANT

## 2025-06-02 PROCEDURE — 1160F RVW MEDS BY RX/DR IN RCRD: CPT | Performed by: PHYSICIAN ASSISTANT

## 2025-06-02 RX ORDER — OMEPRAZOLE 40 MG/1
40 CAPSULE, DELAYED RELEASE ORAL
COMMUNITY
Start: 2025-05-24

## 2025-06-02 RX ORDER — OXYCODONE AND ACETAMINOPHEN 10; 325 MG/1; MG/1
1 TABLET ORAL EVERY 6 HOURS PRN
COMMUNITY
Start: 2025-05-08 | End: 2025-07-07

## 2025-06-02 ASSESSMENT — ENCOUNTER SYMPTOMS
ABDOMINAL PAIN: 0
DIARRHEA: 0
ABDOMINAL DISTENTION: 0
CONSTIPATION: 0
BLOOD IN STOOL: 0
ANAL BLEEDING: 0
VOMITING: 0
NAUSEA: 0

## 2025-06-02 NOTE — PROGRESS NOTES
GASTROENTEROLOGY CLINIC VISIT    CC: follow up - GERD, nausea, diarrhea    HPI  Nini Apple is a 84 y.o. year old female with PMH pertinent for GERD, diverticulitis, chronic nausea who presents to the clinic today for follow up. LOV 4/2 for evaluation of GERD, nausea, diarrhea. At previous visit, omeprazole was increased to BID dosing, pepcid added qhs. She states all GERD symptoms have resolved at this time. She states nausea has also significantly improved. Using zofran rarely. States she is now having a BM every day or every other day without any straining. States she is unsure if she is still taking linzess. Denies melena, hematochezia.    PMHx/PSHx  PMHx: GERD, diverticulitis, CKD, HTN, C diff  PSHx: cholecystectomy, hysterectomy    Family History  Denies FMH gastric or colorectal cancer.   Denies FMH autoimmune disease.     Social History  Smoking history: never  Alcohol use: denies    ROS  Review of Systems   Constitutional:  Negative for activity change, appetite change, chills, fatigue, fever and unexpected weight change.   Gastrointestinal:  Negative for abdominal distention, abdominal pain, anal bleeding, blood in stool, constipation, diarrhea, nausea and vomiting.       Vitals:    06/02/25 1327   BP: (!) 169/91   Pulse: 82   SpO2: 99%       Physical Exam  Vitals and nursing note reviewed.   Constitutional:       General: She is not in acute distress.     Appearance: Normal appearance. She is not ill-appearing, toxic-appearing or diaphoretic.   HENT:      Head: Normocephalic and atraumatic.   Eyes:      General: No scleral icterus.     Conjunctiva/sclera: Conjunctivae normal.   Cardiovascular:      Rate and Rhythm: Normal rate.   Pulmonary:      Effort: Pulmonary effort is normal. No respiratory distress.   Abdominal:      General: Bowel sounds are normal. There is no distension.      Palpations: Abdomen is soft.      Tenderness: There is no abdominal tenderness. There is no guarding or rebound.

## 2025-06-04 ENCOUNTER — HOSPITAL ENCOUNTER (OUTPATIENT)
Dept: PHYSICAL THERAPY | Age: 85
Setting detail: RECURRING SERIES
Discharge: HOME | End: 2025-06-06
Attending: ORTHOPAEDIC SURGERY
Payer: MEDICARE

## 2025-06-04 PROCEDURE — 97110 THERAPEUTIC EXERCISES: CPT

## 2025-06-04 ASSESSMENT — PAIN SCALES - GENERAL: PAINLEVEL_OUTOF10: 5

## 2025-06-04 NOTE — PROGRESS NOTES
Nini Apple  : 1940  [facility]  Secondary: [place]ICAID [place] [facility] @ [place] [address]  [place] [address]  Phone: [phone]  Fax: [phone] Plan Frequency: 2x/week until end POC  Plan of Care/Certification Expiration Date: 25            Plan of Care/Certification Expiration Date:  Plan of Care/Certification Expiration Date: 25    Frequency/Duration:   Plan Frequency: 2x/week until end POC      Time In/Out:   Time In: 1259  Time Out: 1345     PT Visit Info:    Plan Frequency: 2x/week until end POC  Total # of Visits Approved: 0 (eval only+)  Total # of Visits to Date: 2  Progress Note Counter: 2      Visit Count:  2    OUTPATIENT PHYSICAL THERAPY:   Treatment Note 2025       Charge Capture        Episode  (R TKA 25)               Treatment Diagnosis:    Acute pain of right knee  Unsteadiness on feet  Other abnormalities of gait and mobility  Medical/Referring Diagnosis:   Physical therapy evaluation, initial  Primary osteoarthritis of right knee  Hx of total knee arthroplasty, right   Referring Physician: April Alvarado APRN - CNP MD Orders: PT Eval and Treat   Return MD Appt:  25 with Dr. Aguilar  Date of Onset: Onset Date:  (25)  Allergies: Patient has no known allergies.  Interventions Planned (Treatment may consist of any combination of the following): See Assessment Note    Restrictions/Precautions/Relevant PMH:   HTN (well controlled with medication)     Subjective Comments: States she has more back pain than knee pain     Initial Pain Level:     5/10  with medicine   Post Session Pain Level:       4/10     Medications Last Reviewed: 2025    Updated Objective Findings: None Today    Treatment     THERAPEUTIC ACTIVITY: (0 minutes): Therapeutic activities per grid below to improve mobility, strength and balance. Required visual, verbal and tactile cues to promote static

## 2025-06-05 ENCOUNTER — CARE COORDINATION (OUTPATIENT)
Dept: CARE COORDINATION | Facility: CLINIC | Age: 85
End: 2025-06-05

## 2025-06-05 NOTE — CARE COORDINATION
Ambulatory Care Coordination Note     6/5/2025 10:35 AM     Care Summary Note:   Outreach for High Risk Case Management  Reports improvement   - bowels moving   - less nausea, which is chronic - not taking zofran  Blood Pressure high while at GI appointment   - amlodipine       ACM: Sophia Mendez, RN     Utilization: Has the patient been seen in the ED since your last call? no    Offered patient enrollment in the Remote Patient Monitoring (RPM) program for in-home monitoring: Yes, but did not enroll at this time: limited patient ability to navigate RPM/equipment.     PCP/Specialist follow up:   Future Appointments         Provider Specialty Dept Phone    6/9/2025 2:00 PM Britney Barbour, PT Physical Therapy [phone]    6/11/2025 2:30 PM Emma Fitzpatrick, PTA Physical Therapy [phone]    7/28/2025 9:35 AM Rodrigo Aguilar MD Orthopedic Surgery [phone]    11/17/2025 2:00 PM April Alvarado, SERGE - CNP Family Medicine [phone]    5/18/2026 2:00 PM April Alvarado APRN - CNP Family Medicine [phone]

## 2025-06-09 ENCOUNTER — HOSPITAL ENCOUNTER (OUTPATIENT)
Dept: PHYSICAL THERAPY | Age: 85
Setting detail: RECURRING SERIES
Discharge: HOME | End: 2025-06-11
Attending: ORTHOPAEDIC SURGERY
Payer: MEDICARE

## 2025-06-09 PROCEDURE — 97110 THERAPEUTIC EXERCISES: CPT

## 2025-06-09 NOTE — PROGRESS NOTES
sets in hooklying     GAIT TRAINING: (0 minutes): Gait training to improve and/or restore physical functioning as related to mobility, strength, balance and coordination. Ambulated with visual, verbal, and tactile cueing related to stance phase, stride length, push off and heel strike.     MANUAL THERAPY: (0 minutes): Joint mobilization, soft tissue mobilization and manipulation was utilized and necessary because of the patient's restricted joint motion, painful spasm, loss of articular motion and restricted motion of soft tissue.     MODALITIES: (0 minutes): None today      Treatment/Session Summary:    Treatment Assessment: greater difficulty with right sided hip IR, ER, and ABD strengthening in left side lying so modified to sitting. She relays not taking a pain pill prior to therapy.  Communication/Consultation: None today  Equipment provided: Theraband - Green   Recommendations/Intent for next treatment session: Next visit will focus on advancements to more challenging activities. Progress repetitions, weight, and complexity of functional movement per pt tolerance and as indicated.     >Total Treatment Billable Duration: 54 minutes  Time In: 1402  Time Out: 1458     Britney Barbour, PT, DPT, PRPC    Charge Capture  [facility] Portal  Appt Desk  Attendance Report      Future Appointments   Date Time Provider Department Center   6/11/2025  2:30 PM Emma Fitzpatrick, SH[facility]   7/28/2025  9:35 AM Rodrigo Aguilar MD [facility]   11/17/2025  2:00 PM April Alvarado APRN - CNP [facility] DE[facility]   5/18/2026  2:00 PM April Alvarado APRN - CNP [facility] DE[facility]

## 2025-06-11 ENCOUNTER — HOSPITAL ENCOUNTER (OUTPATIENT)
Dept: PHYSICAL THERAPY | Age: 85
Setting detail: RECURRING SERIES
Discharge: HOME | End: 2025-06-13
Attending: ORTHOPAEDIC SURGERY
Payer: MEDICARE

## 2025-06-11 PROCEDURE — 97110 THERAPEUTIC EXERCISES: CPT

## 2025-06-11 NOTE — PROGRESS NOTES
Nini Apple  : 1940  Primary: [facility] Dual Complete (Medicare Managed)  Secondary: [place]ICAID [place] [facility] @ [place] [address]  [place] [address]  Phone: [phone]  Fax: [phone] Plan Frequency: 2x/week until end POC  Plan of Care/Certification Expiration Date: 25            Plan of Care/Certification Expiration Date:  Plan of Care/Certification Expiration Date: 25    Frequency/Duration:   Plan Frequency: 2x/week until end POC      Time In/Out:   Time In: 1427  Time Out: 1512     PT Visit Info:    Plan Frequency: 2x/week until end POC  Total # of Visits Approved: 0 (no auth needed for )  Total # of Visits to Date: 4  Progress Note Counter: 4      Visit Count:  4    OUTPATIENT PHYSICAL THERAPY:   Treatment Note 2025       Charge Capture        Episode  (R TKA 25)               Treatment Diagnosis:    Acute pain of right knee  Unsteadiness on feet  Other abnormalities of gait and mobility  Medical/Referring Diagnosis:   Physical therapy evaluation, initial  Primary osteoarthritis of right knee  Hx of total knee arthroplasty, right   Referring Physician: April Alvarado APRN - CNP MD Orders: PT Eval and Treat   Return MD Appt:  25 with Dr. Aguilar  Date of Onset: Onset Date:  (25)  Allergies: Patient has no known allergies.  Interventions Planned (Treatment may consist of any combination of the following): See Assessment Note    Restrictions/Precautions/Relevant PMH:   HTN (well controlled with medication)     Subjective Comments: My back has hurt more since last visit.      Initial Pain Level:      (10/10 for back  6/10 for knee)  Post Session Pain Level:        (7-8/10 in back and 5/10 knee)/10     Medications Last Reviewed: 2025    Updated Objective Findings: None Today    Treatment     THERAPEUTIC ACTIVITY: (0 minutes): Therapeutic activities per grid below to improve mobility, strength and balance.

## 2025-06-23 ENCOUNTER — APPOINTMENT (OUTPATIENT)
Dept: PHYSICAL THERAPY | Age: 85
End: 2025-06-23
Attending: ORTHOPAEDIC SURGERY
Payer: MEDICARE

## 2025-06-26 ENCOUNTER — CARE COORDINATION (OUTPATIENT)
Dept: CARE COORDINATION | Facility: CLINIC | Age: 85
End: 2025-06-26

## 2025-06-26 NOTE — CARE COORDINATION
Ambulatory Care Coordination Note     6/26/2025 12:35 PM     Care Summary Note:   Outreach for High Risk Case Management - spoke with patient  Patient is doing well  Declines further need for case management services  Has ACM's contact information should there be concerns/questions     ACM: Sophia Mendez, RN     PCP/Specialist follow up:   Future Appointments         Provider Specialty Dept Phone    7/28/2025 9:35 AM Rodrigo Aguilar MD Orthopedic Surgery [phone]    11/17/2025 2:00 PM April Alvarado, SERGE - [facility] Medicine [phone]    5/18/2026 2:00 PM April Alvarado, SERGE - CNP Family Medicine [phone]

## 2025-06-30 ENCOUNTER — APPOINTMENT (OUTPATIENT)
Dept: PHYSICAL THERAPY | Age: 85
End: 2025-06-30
Attending: ORTHOPAEDIC SURGERY
Payer: MEDICARE

## 2025-07-28 ENCOUNTER — OFFICE VISIT (OUTPATIENT)
Dept: ORTHOPEDIC SURGERY | Age: 85
End: 2025-07-28
Payer: MEDICARE

## 2025-07-28 DIAGNOSIS — Z96.651 HISTORY OF TOTAL KNEE ARTHROPLASTY, RIGHT: Primary | ICD-10-CM

## 2025-07-28 DIAGNOSIS — Z09 FOLLOW-UP EXAMINATION: ICD-10-CM

## 2025-07-28 PROCEDURE — 1123F ACP DISCUSS/DSCN MKR DOCD: CPT | Performed by: PHYSICIAN ASSISTANT

## 2025-07-28 PROCEDURE — 1036F TOBACCO NON-USER: CPT | Performed by: PHYSICIAN ASSISTANT

## 2025-07-28 PROCEDURE — 1090F PRES/ABSN URINE INCON ASSESS: CPT | Performed by: PHYSICIAN ASSISTANT

## 2025-07-28 PROCEDURE — 99213 OFFICE O/P EST LOW 20 MIN: CPT | Performed by: PHYSICIAN ASSISTANT

## 2025-07-28 PROCEDURE — G8428 CUR MEDS NOT DOCUMENT: HCPCS | Performed by: PHYSICIAN ASSISTANT

## 2025-07-28 PROCEDURE — G8420 CALC BMI NORM PARAMETERS: HCPCS | Performed by: PHYSICIAN ASSISTANT

## 2025-07-28 NOTE — PROGRESS NOTES
Name: Nini Apple  YOB: 1940  Gender: female  MRN: [id number]    RIGHT Post-Op TKA 6 month follow up    This patient returns now 6 months s/p RIGHT TKA. The patient is doing well and is happy with their level of function. The patient states they are able to ascend stairs normally.  The patient ambulates with no assistive device. The patient takes anti-inflammatories or other pain medication rarely for discomfort related to the operative side.           Interval medical and surgical history is noted in the patient history form and updated today.      Past Medical History:    Allergies:  No Known Allergies    Medications:  Current Outpatient Medications   Medication Sig    omeprazole (PRILOSEC) 40 MG delayed release capsule Take 1 capsule by mouth    methocarbamol (ROBAXIN-750) 750 MG tablet Take 1 tablet by mouth every 6 hours as needed for spasms.    albuterol sulfate HFA (VENTOLIN HFA) 108 (90 Base) MCG/ACT inhaler Inhale 2 puffs into the lungs 4 times daily as needed for Wheezing or Shortness of Breath    amLODIPine (NORVASC) 2.5 MG tablet Take 1 tablet by mouth daily    amitriptyline (ELAVIL) 25 MG tablet Take 2 tablets by mouth nightly    famotidine (PEPCID) 40 MG tablet Take 1 tablet by mouth every evening    ondansetron (ZOFRAN) 4 MG tablet Take 1 tablet by mouth every 6 hours as needed for Nausea or Vomiting    aspirin 81 MG EC tablet Take 1 tablet by mouth 2 times daily    linaclotide (LINZESS) 145 MCG capsule Take 1 capsule by mouth every morning (before breakfast)    melatonin 5 MG TBDP disintegrating tablet Take 1 tablet by mouth nightly     No current facility-administered medications for this visit.       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

## (undated) DEVICE — GOWN,REINFORCED,POLY,SIRUS,XLNG/XXLG: Brand: MEDLINE

## (undated) DEVICE — STERILE PRESSURE PROTECTOR PAD® FOR DE MAYO UNIVERSAL DISTRACTOR® (10/CASE): Brand: DE MAYO UNIVERSAL DISTRACTOR®

## (undated) DEVICE — STERILE SYNTHETIC POLYISOPRENE POWDER-FREE SURGICAL GLOVES WITH HYDROGEL COATING, SMOOTH FINISH, STRAIGHT FINGER: Brand: PROTEXIS

## (undated) DEVICE — ZIP DS DRESSING SHIELD: Brand: ZIP DS DRESSING SHIELD

## (undated) DEVICE — GLOVE ORANGE PI 8   MSG9080

## (undated) DEVICE — SUIT SURG ISOLATN ZIP TOGA XL T7 +

## (undated) DEVICE — Device

## (undated) DEVICE — BLADE SAW OSCILLATING 85X19X2 MM ROBOTIC-ASSISTED VELYS

## (undated) DEVICE — SOLUTION IRRIG 1000ML 0.9% SOD CHL USP POUR PLAS BTL

## (undated) DEVICE — GLOVE SURG SZ 75 CRM LTX FREE POLYISOPRENE POLYMER BEAD ANTI

## (undated) DEVICE — SOLUTION IRRIG 3000ML 0.9% SOD CHL USP UROMATIC PLAS CONT

## (undated) DEVICE — GLOVE SURG SZ 65 THK91MIL LTX FREE SYN POLYISOPRENE

## (undated) DEVICE — SUTURE VICRYL + SZ 2-0 L27IN ABSRB UD CP-1 1/2 CIR REV CUT VCP266H

## (undated) DEVICE — DRAPE EQUIP SATELLITE STN STRL VELYS

## (undated) DEVICE — TOTAL KNEE: Brand: MEDLINE INDUSTRIES, INC.

## (undated) DEVICE — SUTURE ABSORBABLE MONOFILAMENT 1 CTX 36 CM 48 MM VIO PDS +

## (undated) DEVICE — GLOVE SURG SZ 65 L12IN FNGR THK79MIL GRN LTX FREE

## (undated) DEVICE — BIPOLAR SEALER 23-112-1 AQM 6.0: Brand: AQUAMANTYS ®

## (undated) DEVICE — GLOVE SURG SZ 8 L12IN FNGR THK79MIL GRN LTX FREE

## (undated) DEVICE — SOLUTION IV 250ML 0.9% SOD CHL PH 5 INJ USP VIAFLX PLAS

## (undated) DEVICE — HOOD: Brand: T7PLUS

## (undated) DEVICE — SUTURE VICRYL SZ 1 L27IN ABSRB UD L36MM CP-1 1/2 CIR REV CUT J268H

## (undated) DEVICE — STERILE PVP: Brand: MEDLINE INDUSTRIES, INC.

## (undated) DEVICE — SOLUTION IRRIG 1000ML STRL H2O USP PLAS POUR BTL

## (undated) DEVICE — PIN DRL 4X125 MM ROBOTIC-ASSISTED SOLUTION ARRY VELYS

## (undated) DEVICE — DUAL CUT SAGITTAL BLADE

## (undated) DEVICE — BLADE RMR L41MM PAT PILOT H

## (undated) DEVICE — DRAPE EQUIP ROBOT STRL VELYS 20/PK ORDER IN MULTIIPLES OF 20 EACH

## (undated) DEVICE — SYRINGE MED 50ML LUERLOCK TIP